# Patient Record
Sex: FEMALE | URBAN - METROPOLITAN AREA
[De-identification: names, ages, dates, MRNs, and addresses within clinical notes are randomized per-mention and may not be internally consistent; named-entity substitution may affect disease eponyms.]

---
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
## 2019-02-09 ENCOUNTER — APPOINTMENT (OUTPATIENT)
Dept: URBAN - METROPOLITAN AREA CLINIC 237 | Age: 19
Setting detail: DERMATOLOGY
End: 2019-02-09

## 2019-02-09 DIAGNOSIS — L81.0 POSTINFLAMMATORY HYPERPIGMENTATION: ICD-10-CM

## 2019-02-09 DIAGNOSIS — E78.00 PURE HYPERCHOLESTEROLEMIA, UNSPECIFIED: ICD-10-CM

## 2019-02-09 DIAGNOSIS — L70.0 ACNE VULGARIS: ICD-10-CM

## 2019-02-09 DIAGNOSIS — Z79.899 OTHER LONG TERM (CURRENT) DRUG THERAPY: ICD-10-CM

## 2019-02-09 PROCEDURE — OTHER PRESCRIPTION: OTHER

## 2019-02-09 PROCEDURE — OTHER ISOTRETINOIN MONITORING: OTHER

## 2019-02-09 PROCEDURE — OTHER MIPS QUALITY: OTHER

## 2019-02-09 PROCEDURE — OTHER OTHER: OTHER

## 2019-02-09 PROCEDURE — OTHER TREATMENT REGIMEN: OTHER

## 2019-02-09 PROCEDURE — OTHER PATIENT SPECIFIC COUNSELING: OTHER

## 2019-02-09 PROCEDURE — OTHER COUNSELING: OTHER

## 2019-02-09 PROCEDURE — OTHER ORDER TESTS: OTHER

## 2019-02-09 PROCEDURE — 99214 OFFICE O/P EST MOD 30 MIN: CPT

## 2019-02-09 RX ORDER — ISOTRETINOIN 10 MG/1
1 CAPSULE, LIQUID FILLED ORAL QD
Qty: 30 | Refills: 0 | Status: ERX

## 2019-02-09 ASSESSMENT — LOCATION DETAILED DESCRIPTION DERM
LOCATION DETAILED: LEFT LATERAL BUCCAL CHEEK
LOCATION DETAILED: RIGHT INFERIOR LATERAL BUCCAL CHEEK
LOCATION DETAILED: RIGHT CENTRAL BUCCAL CHEEK
LOCATION DETAILED: RIGHT INFERIOR CENTRAL MALAR CHEEK
LOCATION DETAILED: LEFT INFERIOR CENTRAL MALAR CHEEK
LOCATION DETAILED: LEFT CENTRAL MANDIBULAR CHEEK

## 2019-02-09 ASSESSMENT — LOCATION SIMPLE DESCRIPTION DERM
LOCATION SIMPLE: RIGHT CHEEK
LOCATION SIMPLE: LEFT CHEEK

## 2019-02-09 ASSESSMENT — LOCATION ZONE DERM: LOCATION ZONE: FACE

## 2019-02-09 NOTE — PROCEDURE: TREATMENT REGIMEN
Initiate Treatment: Isotretinoin 10 mg qd
Detail Level: Simple
Continue Regimen: OTC Qd\\nAldactone 100mg qd

## 2019-02-09 NOTE — PROCEDURE: PATIENT SPECIFIC COUNSELING
Detail Level: Zone
Pt DID NOT start her Acc. She did not fill her Rx from the pharmacy due to having issues with the [employer] system online. MD stressed that she needs to complete her online test in order to be eligible to receive her medication. Will proceed with dose of 10mg qd as originally planned.
Pt does still need to find a doctor who will monitor this condition. She needs to actively search (online) for someone that takes her insurance. Advised that the likelihood of her cholesterol rising while on Acc is significant even if she is proactive about controlling it. Due to this, we will not be able to continue tx for very long without proper monitoring.

## 2019-02-09 NOTE — PROCEDURE: ISOTRETINOIN MONITORING
Kilograms Preamble Statement (Weight Entered In Details Tab): Reported Weight in kilograms:
Patient Weight (Optional But Required For Cumulative Dose-Numbers And Decimals Only): 125
Female Completion Statement: After discussing her treatment course we decided to discontinue isotretinoin therapy at this time. I explained that she would need to continue her birth control methods for at least one month after the last dosage. She should also get a pregnancy test one month after the last dose. She shouldn't donate blood for one month after the last dose. She should call with any new symptoms of depression.
What Is The Patient's Gender: Female
Most Recent Beta Hcg (Optional): <1
Headache Monitoring: I recommended monitoring the headaches for now. There is no evidence of increased intracranial pressure. They were instructed to call if the headaches are worsening.
Dosing Month 1 (Required For Cumulative Dosing): 10mg Daily
Retinoid Dermatitis Normal Treatment: I recommended more frequent application of Cetaphil or CeraVe to the areas of dermatitis.
Xerosis Normal Treatment: I recommended application of Cetaphil or CeraVe numerous times a day and before going to bed to all dry areas.
Add Associated Diagnosis When Managing Medication Side Effects: Yes
Myalgia Monitoring: I explained this is common when taking isotretinoin. If this worsens they will contact us.
Most Recent Lfts (Optional): ALT: 13N; AST: 16N
Counseling Text: I reviewed the side effect in detail. Patient should get monthly blood tests, not donate blood, not drive at night if vision affected, and not share medication.
Hypertriglyceridemia Monitoring: I explained this is common when taking isotretinoin. We will monitor closely.
Myalgia Treatment: I explained this is common when taking isotretinoin. If this worsens they will contact us. They may try OTC ibuprofen.
Nosebleeds Normal Treatment: I explained this is common when taking isotretinoin. I recommended saline mist in each nostril multiple times a day. If this worsens they will contact us.
Female Pregnancy Counseling Text: Female patients should also be on two forms of birth control while taking this medication and for one month after their last dose.
Most Recent Cbc (Optional): All normal except: RBC: 5.30H, Hematocrit: 44.8H, MCH: 25L, MCHC: 30L, Platelet: 433H
Weight Units: pounds
Months Of Therapy Completed: 1
Calculate Months Of Therapy Based On Documented Dosages (Will Hide Months Of Therapy Question)?: No
Retinoid Dermatitis Aggressive Treatment: I recommended more frequent application of Cetaphil or CeraVe to the areas of dermatitis. I also prescribed a topical steroid for twice daily use until the dermatitis resolves.
Xerosis Aggressive Treatment: I recommended application of Cetaphil or CeraVe numerous times a day and before going to bed to all dry areas. I also prescribed a topical steroid for twice daily use.
Cheilitis Normal Treatment: I recommended application of Vaseline or Aquaphor numerous times a day (as often as every hour) and before going to bed.
Detail Level: Zone
Cheilitis Aggressive Treatment: I recommended application of Vaseline or Aquaphor numerous times a day (as often as every hour) and before going to bed. I also prescribed a topical steroid for twice daily use.
Male Completion Statement: After discussing his treatment course we decided to discontinue isotretinoin therapy at this time. He shouldn't donate blood for one month after the last dose. He should call with any new symptoms of depression.
Most Recent Triglycerides (Optional): 403H
Xerosis Aggressive Treatment: I recommended application of Cetaphil or CeraVe numerous times a day going to bed to all dry areas. I also prescribed a topical steroid for twice daily use.
Comments: Cumulative dose: 300mg (Range: 6,000-9,000mg/kg)
Xerosis Normal Treatment: I recommended application of Cetaphil or CeraVe numerous times a day going to bed to all dry areas.
Pounds Preamble Statement (Weight Entered In Details Tab): Reported Weight in pounds:
Most Recent Cholesterol (Optional): 340H; HDL: 64N; LDL: not calculated Non-HDL Cholesterol, calculated: 276H; Cholesterol/HDL Ratio: 5.3H

## 2019-02-09 NOTE — PROCEDURE: OTHER
Note Text (......Xxx Chief Complaint.): This diagnosis correlates with the SKs below bra line.
Detail Level: Simple
Other (Free Text): All mostly normal except cholesterol. See below PSC.

## 2019-03-13 ENCOUNTER — APPOINTMENT (OUTPATIENT)
Dept: URBAN - METROPOLITAN AREA CLINIC 237 | Age: 19
Setting detail: DERMATOLOGY
End: 2019-03-13

## 2019-03-13 DIAGNOSIS — L70.0 ACNE VULGARIS: ICD-10-CM

## 2019-03-13 DIAGNOSIS — Z79.899 OTHER LONG TERM (CURRENT) DRUG THERAPY: ICD-10-CM

## 2019-03-13 DIAGNOSIS — E78.00 PURE HYPERCHOLESTEROLEMIA, UNSPECIFIED: ICD-10-CM

## 2019-03-13 PROCEDURE — OTHER MIPS QUALITY: OTHER

## 2019-03-13 PROCEDURE — OTHER URINE PREGNANCY TEST: OTHER

## 2019-03-13 PROCEDURE — 81025 URINE PREGNANCY TEST: CPT

## 2019-03-13 PROCEDURE — OTHER PRESCRIPTION: OTHER

## 2019-03-13 PROCEDURE — OTHER PATIENT SPECIFIC COUNSELING: OTHER

## 2019-03-13 PROCEDURE — OTHER ORDER TESTS: OTHER

## 2019-03-13 PROCEDURE — OTHER OTHER: OTHER

## 2019-03-13 PROCEDURE — OTHER TREATMENT REGIMEN: OTHER

## 2019-03-13 PROCEDURE — 99214 OFFICE O/P EST MOD 30 MIN: CPT

## 2019-03-13 PROCEDURE — OTHER ISOTRETINOIN MONITORING: OTHER

## 2019-03-13 RX ORDER — ISOTRETINOIN 20 MG/1
1 CAPSULE, LIQUID FILLED ORAL QD
Qty: 30 | Refills: 0 | Status: ERX | COMMUNITY
Start: 2019-03-13

## 2019-03-13 ASSESSMENT — LOCATION SIMPLE DESCRIPTION DERM
LOCATION SIMPLE: RIGHT CHEEK
LOCATION SIMPLE: LEFT CHEEK

## 2019-03-13 ASSESSMENT — LOCATION ZONE DERM: LOCATION ZONE: FACE

## 2019-03-13 NOTE — PROCEDURE: ISOTRETINOIN MONITORING
Display Individual Monthly Dosage In The Note (If Yes Will Display All Dosages Which Are Not N/A): yes
Cheilitis Normal Treatment: I recommended application of Vaseline or Aquaphor numerous times a day (as often as every hour) and before going to bed.
Use Enhanced Counseling Feature (Automatic): No
Myalgia Monitoring: I explained this is common when taking isotretinoin. If this worsens they will contact us.
Months Of Therapy Completed: 1
Cheilitis Aggressive Treatment: I recommended application of Vaseline or Aquaphor numerous times a day (as often as every hour) and before going to bed. I also prescribed a topical steroid for twice daily use.
Hypertriglyceridemia Monitoring: I explained this is common when taking isotretinoin. We will monitor closely.
Myalgia Treatment: I explained this is common when taking isotretinoin. If this worsens they will contact us. They may try OTC ibuprofen.
Nosebleeds Normal Treatment: I explained this is common when taking isotretinoin. I recommended saline mist in each nostril multiple times a day. If this worsens they will contact us.
Pounds Preamble Statement (Weight Entered In Details Tab): Reported Weight in pounds:
Xerosis Normal Treatment: I recommended application of Cetaphil or CeraVe numerous times a day and before going to bed to all dry areas.
Most Recent Cbc (Optional): All normal except: MCH: 25L, MCHC: 30L, Platelet: 430H, Immature Granulocytes: 0.04H
Detail Level: Zone
Most Recent Triglycerides (Optional): 215H
What Is The Patient's Gender: Female
Retinoid Dermatitis Normal Treatment: I recommended more frequent application of Cetaphil or CeraVe to the areas of dermatitis.
Retinoid Dermatitis Aggressive Treatment: I recommended more frequent application of Cetaphil or CeraVe to the areas of dermatitis. I also prescribed a topical steroid for twice daily use until the dermatitis resolves.
Patient Weight (Optional But Required For Cumulative Dose-Numbers And Decimals Only): 125
Xerosis Aggressive Treatment: I recommended application of Cetaphil or CeraVe numerous times a day and before going to bed to all dry areas. I also prescribed a topical steroid for twice daily use.
Male Completion Statement: After discussing his treatment course we decided to discontinue isotretinoin therapy at this time. He shouldn't donate blood for one month after the last dose. He should call with any new symptoms of depression.
Kilograms Preamble Statement (Weight Entered In Details Tab): Reported Weight in kilograms:
Xerosis Normal Treatment: I recommended application of Cetaphil or CeraVe numerous times a day going to bed to all dry areas.
Female Completion Statement: After discussing her treatment course we decided to discontinue isotretinoin therapy at this time. I explained that she would need to continue her birth control methods for at least one month after the last dosage. She should also get a pregnancy test one month after the last dose. She shouldn't donate blood for one month after the last dose. She should call with any new symptoms of depression.
Dosing Month 2 (Required For Cumulative Dosing): 20mg Daily
Headache Monitoring: I recommended monitoring the headaches for now. There is no evidence of increased intracranial pressure. They were instructed to call if the headaches are worsening.
Most Recent Beta Hcg (Optional): <1
Counseling Text: I reviewed the side effect in detail. Patient should get monthly blood tests, not donate blood, not drive at night if vision affected, and not share medication.
Xerosis Aggressive Treatment: I recommended application of Cetaphil or CeraVe numerous times a day going to bed to all dry areas. I also prescribed a topical steroid for twice daily use.
Dosing Month 1 (Required For Cumulative Dosing): 10mg Daily
Comments: Cumulative dose: 300 + 600mg =900mg (Range: 6,000-9,000mg/kg)
Female Pregnancy Counseling Text: Female patients should also be on two forms of birth control while taking this medication and for one month after their last dose.
Most Recent Cholesterol (Optional): 289H; HDL: 60N; LDL: 186H; Non-HDL Cholesterol: 229H; Cholesterol/HDL Ratio: 4.8N
Most Recent Lfts (Optional): ALT: 12N; AST: 18N
Weight Units: pounds

## 2019-03-13 NOTE — PROCEDURE: OTHER
Detail Level: Simple
Other (Free Text): Pt’s CBC w/ Diff has some abnormal values, but nothing of concern at this time. Will monitor to see if they worsen. See above acne PSC for BW discussion RE: cholesterol.
Note Text (......Xxx Chief Complaint.): This diagnosis correlates with the SKs below bra line.

## 2019-03-13 NOTE — PROCEDURE: TREATMENT REGIMEN
Continue Regimen: OTC Qd\\nAldactone 100mg qd
Detail Level: Simple
Modify Regimen: Increase Isotretinoin to 20mg qd

## 2019-03-13 NOTE — PROCEDURE: PATIENT SPECIFIC COUNSELING
See above acne PSC.
MD recommends increasing Pt to 20mg Acc since she is taking measures to decrease her cholesterol. Advised Pt that her cholesterol level is still elevated, but is improved from baseline. Her triglycerides are higher than baseline, but MD is not as concerned about this level. Pt’s new PCP will need to be made aware that she is on Acc when she reviews her BW.\\n\\nRecommended ILS to a large IP on L temple, but pt states that it is going away fairly quickly so she would prefer not to tx.
Detail Level: Zone

## 2019-04-12 ENCOUNTER — APPOINTMENT (OUTPATIENT)
Dept: URBAN - METROPOLITAN AREA CLINIC 237 | Age: 19
Setting detail: DERMATOLOGY
End: 2019-04-12

## 2019-04-17 ENCOUNTER — APPOINTMENT (OUTPATIENT)
Dept: URBAN - METROPOLITAN AREA CLINIC 237 | Age: 19
Setting detail: DERMATOLOGY
End: 2019-04-17

## 2019-04-17 DIAGNOSIS — Z79.899 OTHER LONG TERM (CURRENT) DRUG THERAPY: ICD-10-CM

## 2019-04-17 DIAGNOSIS — E78.1 PURE HYPERGLYCERIDEMIA: ICD-10-CM

## 2019-04-17 DIAGNOSIS — L70.0 ACNE VULGARIS: ICD-10-CM

## 2019-04-17 DIAGNOSIS — E78.00 PURE HYPERCHOLESTEROLEMIA, UNSPECIFIED: ICD-10-CM

## 2019-04-17 PROCEDURE — OTHER ISOTRETINOIN MONITORING: OTHER

## 2019-04-17 PROCEDURE — 81025 URINE PREGNANCY TEST: CPT

## 2019-04-17 PROCEDURE — OTHER PRESCRIPTION: OTHER

## 2019-04-17 PROCEDURE — 99213 OFFICE O/P EST LOW 20 MIN: CPT

## 2019-04-17 PROCEDURE — OTHER MIPS QUALITY: OTHER

## 2019-04-17 PROCEDURE — OTHER ORDER TESTS: OTHER

## 2019-04-17 PROCEDURE — OTHER PATIENT SPECIFIC COUNSELING: OTHER

## 2019-04-17 PROCEDURE — OTHER TREATMENT REGIMEN: OTHER

## 2019-04-17 PROCEDURE — OTHER URINE PREGNANCY TEST: OTHER

## 2019-04-17 RX ORDER — NORGESTIMATE AND ETHINYL ESTRADIOL 7DAYSX3 28
KIT ORAL QD
Qty: 84 | Refills: 1 | Status: ERX

## 2019-04-17 RX ORDER — ISOTRETINOIN 20 MG/1
1 CAPSULE, LIQUID FILLED ORAL QD
Qty: 30 | Refills: 0 | Status: ERX

## 2019-04-17 RX ORDER — SIMVASTATIN 20 MG/1
1 TABLET, FILM COATED ORAL QD
Qty: 30 | Refills: 0 | Status: ERX | COMMUNITY
Start: 2019-04-17

## 2019-04-17 ASSESSMENT — LOCATION SIMPLE DESCRIPTION DERM
LOCATION SIMPLE: LEFT CHEEK
LOCATION SIMPLE: RIGHT CHEEK

## 2019-04-17 ASSESSMENT — LOCATION ZONE DERM: LOCATION ZONE: FACE

## 2019-04-17 NOTE — PROCEDURE: TREATMENT REGIMEN
Detail Level: Simple
Discontinue Regimen: Aldactone 100mg qd
Continue Regimen: OTC Qd\\nIsotretinoin to 20mg qd

## 2019-04-17 NOTE — PROCEDURE: PATIENT SPECIFIC COUNSELING
Detail Level: Zone
See above.
Advised pt she appears to be doing very well. While we could increase the dosage at this time to finish treatment faster, pt appears to be doing well on this dosage, but has high blood fats and these would worsen on a higher dose.
Pt currently under the care PCP (Dr. CARIDAD Berrios) for this problem. Since she is out of simvistatin, MD advised we can prescribe it to her (pt notes that her PCP increased her dosage to 20 mg). Pt is supposed to F/U w/ PCP in 2 mo.

## 2019-04-17 NOTE — PROCEDURE: ISOTRETINOIN MONITORING
Most Recent Cholesterol (Optional): 290H; HDL: 54N; LDL: 178H; Non-HDL Cholesterol: 236H; Cholesterol/HDL Ratio: 5.4
Is Xerosis Present?: Yes - Normal Treatment
Hypertriglyceridemia Treatment: I explained this is common when taking isotretinoin. If this worsens they will contact us. They may try OTC ibuprofen.
Retinoid Dermatitis Aggressive Treatment: I recommended more frequent application of Cetaphil or CeraVe to the areas of dermatitis. I also prescribed a topical steroid for twice daily use until the dermatitis resolves.
Add Associated Diagnosis When Managing Medication Side Effects: Yes
Hypertriglyceridemia Monitoring: I explained this is common when taking isotretinoin. If this worsens they will contact us.
Counseling Text: I reviewed the side effect in detail. Patient should get monthly blood tests, not donate blood, not drive at night if vision affected, and not share medication.
Completed Therapy?: No
Headache Monitoring: I recommended monitoring the headaches for now. There is no evidence of increased intracranial pressure. They were instructed to call if the headaches are worsening.
Dosing Month 2 (Required For Cumulative Dosing): 20mg Daily
Most Recent Triglycerides (Optional): 290H
Xerosis Normal Treatment: I recommended application of Cetaphil or CeraVe numerous times a day going to bed to all dry areas.
Cheilitis Normal Treatment: I recommended application of Vaseline or Aquaphor numerous times a day (as often as every hour) and before going to bed.
Patient Weight (Optional But Required For Cumulative Dose-Numbers And Decimals Only): 125
Hypertriglyceridemia Monitoring: I explained this is common when taking isotretinoin. We will monitor closely.
Kilograms Preamble Statement (Weight Entered In Details Tab): Reported Weight in kilograms:
Pounds Preamble Statement (Weight Entered In Details Tab): Reported Weight in pounds:
Dosing Month 1 (Required For Cumulative Dosing): 10mg Daily
Weight Units: pounds
Detail Level: Zone
Female Completion Statement: After discussing her treatment course we decided to discontinue isotretinoin therapy at this time. I explained that she would need to continue her birth control methods for at least one month after the last dosage. She should also get a pregnancy test one month after the last dose. She shouldn't donate blood for one month after the last dose. She should call with any new symptoms of depression.
Most Recent Cbc (Optional): All normal except MCH: 25L, MCHC: 30L, Platelets: 459H
Retinoid Dermatitis Normal Treatment: I recommended more frequent application of Cetaphil or CeraVe to the areas of dermatitis.
Most Recent Lfts (Optional): ALT: 15N; AST: 18N
Any Hypercholesterolemia?: Yes - Monitoring
Nosebleeds Normal Treatment: I explained this is common when taking isotretinoin. I recommended saline mist in each nostril multiple times a day. If this worsens they will contact us.
Xerosis Aggressive Treatment: I recommended application of Cetaphil or CeraVe numerous times a day and before going to bed to all dry areas. I also prescribed a topical steroid for twice daily use.
Most Recent Beta Hcg (Optional): <1 (Labs are from 4/6/19)
Months Of Therapy Completed: 3
Xerosis Aggressive Treatment: I recommended application of Cetaphil or CeraVe numerous times a day going to bed to all dry areas. I also prescribed a topical steroid for twice daily use.
Cheilitis Aggressive Treatment: I recommended application of Vaseline or Aquaphor numerous times a day (as often as every hour) and before going to bed. I also prescribed a topical steroid for twice daily use.
Male Completion Statement: After discussing his treatment course we decided to discontinue isotretinoin therapy at this time. He shouldn't donate blood for one month after the last dose. He should call with any new symptoms of depression.
What Is The Patient's Gender: Female
Comments: Cumulative dose: 900mg + 600 mg= 1500 mg Range: 6,000-9,000mg/kg)
Xerosis Normal Treatment: I recommended application of Cetaphil or CeraVe numerous times a day and before going to bed to all dry areas.
Female Pregnancy Counseling Text: Female patients should also be on two forms of birth control while taking this medication and for one month after their last dose.

## 2019-05-15 ENCOUNTER — APPOINTMENT (OUTPATIENT)
Dept: URBAN - METROPOLITAN AREA CLINIC 237 | Age: 19
Setting detail: DERMATOLOGY
End: 2019-05-15

## 2019-05-15 DIAGNOSIS — E78.00 PURE HYPERCHOLESTEROLEMIA, UNSPECIFIED: ICD-10-CM

## 2019-05-15 DIAGNOSIS — E78.1 PURE HYPERGLYCERIDEMIA: ICD-10-CM

## 2019-05-15 DIAGNOSIS — L70.0 ACNE VULGARIS: ICD-10-CM

## 2019-05-15 DIAGNOSIS — Z79.899 OTHER LONG TERM (CURRENT) DRUG THERAPY: ICD-10-CM

## 2019-05-15 PROCEDURE — OTHER ORDER TESTS: OTHER

## 2019-05-15 PROCEDURE — OTHER MIPS QUALITY: OTHER

## 2019-05-15 PROCEDURE — OTHER TREATMENT REGIMEN: OTHER

## 2019-05-15 PROCEDURE — OTHER PRESCRIPTION: OTHER

## 2019-05-15 PROCEDURE — OTHER PATIENT SPECIFIC COUNSELING: OTHER

## 2019-05-15 PROCEDURE — 99213 OFFICE O/P EST LOW 20 MIN: CPT

## 2019-05-15 PROCEDURE — OTHER ISOTRETINOIN MONITORING: OTHER

## 2019-05-15 PROCEDURE — 81025 URINE PREGNANCY TEST: CPT

## 2019-05-15 PROCEDURE — OTHER URINE PREGNANCY TEST: OTHER

## 2019-05-15 RX ORDER — ISOTRETINOIN 10 MG/1
1 CAPSULE, LIQUID FILLED ORAL QD
Qty: 30 | Refills: 0 | Status: ERX | COMMUNITY
Start: 2019-05-15

## 2019-05-15 ASSESSMENT — SEVERITY ASSESSMENT OVERALL AMONG ALL PATIENTS
IN YOUR EXPERIENCE, AMONG ALL PATIENTS YOU HAVE SEEN WITH THIS CONDITION, HOW SEVERE IS THIS PATIENT'S CONDITION?: ALMOST CLEAR

## 2019-05-15 ASSESSMENT — LOCATION SIMPLE DESCRIPTION DERM
LOCATION SIMPLE: LEFT CHEEK
LOCATION SIMPLE: RIGHT CHEEK

## 2019-05-15 ASSESSMENT — LOCATION ZONE DERM: LOCATION ZONE: FACE

## 2019-05-15 NOTE — PROCEDURE: ISOTRETINOIN MONITORING
Most Recent Cholesterol (Optional): 342H; HDL: 49N; LDL: cannot he calculated due to high TGs, Non-HDL Cholesterol: 293H; Cholesterol/HDL Ratio: 7.0
Are Labs Available For Review?: Yes
What Is The Patient's Gender: Female
Myalgia Monitoring: I explained this is common when taking isotretinoin. If this worsens they will contact us.
Headache Monitoring: I recommended monitoring the headaches for now. There is no evidence of increased intracranial pressure. They were instructed to call if the headaches are worsening.
Male Completion Statement: After discussing his treatment course we decided to discontinue isotretinoin therapy at this time. He shouldn't donate blood for one month after the last dose. He should call with any new symptoms of depression.
Cheilitis Aggressive Treatment: I recommended application of Vaseline or Aquaphor numerous times a day (as often as every hour) and before going to bed. I also prescribed a topical steroid for twice daily use.
Xerosis Normal Treatment: I recommended application of Cetaphil or CeraVe numerous times a day and before going to bed to all dry areas.
Pounds Preamble Statement (Weight Entered In Details Tab): Reported Weight in pounds:
Most Recent Beta Hcg (Optional): <1
Retinoid Dermatitis Normal Treatment: I recommended more frequent application of Cetaphil or CeraVe to the areas of dermatitis.
Most Recent Cbc (Optional): All normal except MCH: 25L, MCHC: 30L, Platelets: 420H, RBC: 5.23H
Detail Level: Zone
Calculate Months Of Therapy Based On Documented Dosages (Will Hide Months Of Therapy Question)?: No
Myalgia Treatment: I explained this is common when taking isotretinoin. If this worsens they will contact us. They may try OTC ibuprofen.
Dosing Month 4 (Required For Cumulative Dosing): 10mg Daily
Cheilitis Normal Treatment: I recommended application of Vaseline or Aquaphor numerous times a day (as often as every hour) and before going to bed.
Hypercholesterolemia Monitoring: I explained this is common when taking isotretinoin. We will monitor closely.
Comments: Cumulative dose: 1500 mg + 300 mg= 1800 mg  (Range: 6,000-9,000mg/kg)
Dosing Month 2 (Required For Cumulative Dosing): 20mg Daily
Kilograms Preamble Statement (Weight Entered In Details Tab): Reported Weight in kilograms:
Hypertriglyceridemia Monitoring: I explained this is common when taking isotretinoin.  See PSC under acne
Months Of Therapy Completed: 4
Nosebleeds Normal Treatment: I explained this is common when taking isotretinoin. I recommended saline mist in each nostril multiple times a day. If this worsens they will contact us.
Xerosis Aggressive Treatment: I recommended application of Cetaphil or CeraVe numerous times a day and before going to bed to all dry areas. I also prescribed a topical steroid for twice daily use.
Any Hypertriglyceridemia?: Yes - Monitoring
Retinoid Dermatitis Aggressive Treatment: I recommended more frequent application of Cetaphil or CeraVe to the areas of dermatitis. I also prescribed a topical steroid for twice daily use until the dermatitis resolves.
Weight Units: pounds
Xerosis Normal Treatment: I recommended application of Cetaphil or CeraVe numerous times a day going to bed to all dry areas.
Is Cheilitis Present?: Yes - Normal Treatment
Most Recent Triglycerides (Optional): 529H
Female Pregnancy Counseling Text: Female patients should also be on two forms of birth control while taking this medication and for one month after their last dose.
Counseling Text: I reviewed the side effect in detail. Patient should get monthly blood tests, not donate blood, not drive at night if vision affected, and not share medication.
Female Completion Statement: After discussing her treatment course we decided to discontinue isotretinoin therapy at this time. I explained that she would need to continue her birth control methods for at least one month after the last dosage. She should also get a pregnancy test one month after the last dose. She shouldn't donate blood for one month after the last dose. She should call with any new symptoms of depression.
Most Recent Lfts (Optional): ALT: 19N; AST: 18N
Xerosis Aggressive Treatment: I recommended application of Cetaphil or CeraVe numerous times a day going to bed to all dry areas. I also prescribed a topical steroid for twice daily use.
Patient Weight (Optional But Required For Cumulative Dose-Numbers And Decimals Only): 125

## 2019-05-15 NOTE — PROCEDURE: PATIENT SPECIFIC COUNSELING
Advised pt of significant cholesterol and triglyceride elevations, even while she is on Simvastatin 20 mg. Discussed that we will need to decrease dose down to 10 mg due to this. Also advised pt she needs to get in to see her PCP ASAP to discuss these results. Will give pt copy of results to share with her doctor. Recheck lipid panel in 2 weeks
Detail Level: Zone
See above.

## 2019-06-14 ENCOUNTER — APPOINTMENT (OUTPATIENT)
Dept: URBAN - METROPOLITAN AREA CLINIC 237 | Age: 19
Setting detail: DERMATOLOGY
End: 2019-07-09

## 2019-06-14 DIAGNOSIS — K13.0 DISEASES OF LIPS: ICD-10-CM

## 2019-06-14 DIAGNOSIS — E78.1 PURE HYPERGLYCERIDEMIA: ICD-10-CM

## 2019-06-14 DIAGNOSIS — Z79.899 OTHER LONG TERM (CURRENT) DRUG THERAPY: ICD-10-CM

## 2019-06-14 DIAGNOSIS — L70.0 ACNE VULGARIS: ICD-10-CM

## 2019-06-14 DIAGNOSIS — L91.0 HYPERTROPHIC SCAR: ICD-10-CM

## 2019-06-14 PROCEDURE — OTHER MIPS QUALITY: OTHER

## 2019-06-14 PROCEDURE — 99213 OFFICE O/P EST LOW 20 MIN: CPT | Mod: 25

## 2019-06-14 PROCEDURE — OTHER URINE PREGNANCY TEST: OTHER

## 2019-06-14 PROCEDURE — OTHER COUNSELING: OTHER

## 2019-06-14 PROCEDURE — OTHER ISOTRETINOIN MONITORING: OTHER

## 2019-06-14 PROCEDURE — OTHER INTRALESIONAL KENALOG: OTHER

## 2019-06-14 PROCEDURE — OTHER PATIENT SPECIFIC COUNSELING: OTHER

## 2019-06-14 PROCEDURE — 11900 INJECT SKIN LESIONS </W 7: CPT

## 2019-06-14 PROCEDURE — 81025 URINE PREGNANCY TEST: CPT

## 2019-06-14 PROCEDURE — OTHER PRESCRIPTION: OTHER

## 2019-06-14 PROCEDURE — OTHER TREATMENT REGIMEN: OTHER

## 2019-06-14 PROCEDURE — OTHER ORDER TESTS: OTHER

## 2019-06-14 RX ORDER — NYSTATIN AND TRIAMCINOLONE ACETONIDE 100000; 1 [USP'U]/G; MG/G
OINTMENT TOPICAL
Qty: 1 | Refills: 0 | Status: ERX | COMMUNITY
Start: 2019-06-14

## 2019-06-14 RX ORDER — ISOTRETINOIN 20 MG/1
CAPSULE, LIQUID FILLED ORAL
Qty: 30 | Refills: 0 | Status: ERX

## 2019-06-14 ASSESSMENT — LOCATION DETAILED DESCRIPTION DERM
LOCATION DETAILED: LEFT CENTRAL MANDIBULAR CHEEK
LOCATION DETAILED: RIGHT INFERIOR LATERAL BUCCAL CHEEK
LOCATION DETAILED: UPPER STERNUM
LOCATION DETAILED: RIGHT INFERIOR VERMILION LIP

## 2019-06-14 ASSESSMENT — LOCATION ZONE DERM
LOCATION ZONE: TRUNK
LOCATION ZONE: LIP
LOCATION ZONE: FACE

## 2019-06-14 ASSESSMENT — LOCATION SIMPLE DESCRIPTION DERM
LOCATION SIMPLE: LEFT CHEEK
LOCATION SIMPLE: CHEST
LOCATION SIMPLE: RIGHT LIP
LOCATION SIMPLE: RIGHT CHEEK

## 2019-06-14 NOTE — PROCEDURE: TREATMENT REGIMEN
Detail Level: Simple
Continue Regimen: OTC Qd
Continue Regimen: AHU frequently
Modify Regimen: Isotretinoin 20 mg qod until PCP OV
Detail Level: Zone
Initiate Treatment: Mycolog-II bid until clear

## 2019-06-14 NOTE — PROCEDURE: INTRALESIONAL KENALOG
Medical Necessity Clause: This procedure was medically necessary because the lesions that were treated were:
X Size Of Lesion In Cm (Optional): 0
Administered By (Optional): PA
Consent: The risks of atrophy were reviewed with the patient.
Include Z78.9 (Other Specified Conditions Influencing Health Status) As An Associated Diagnosis?: No
Kenalog Preparation: Kenalog
Total Volume Injected (Ccs- Only Use Numbers And Decimals): 0.1
Detail Level: Detailed
Concentration Of Solution Injected (Mg/Ml): 10.0

## 2019-06-14 NOTE — PROCEDURE: ISOTRETINOIN MONITORING
Most Recent Cbc (Optional): All normal except MCH: 25L, MCHC: 30L, RBC: 5.10H
Dosing Month 4 (Required For Cumulative Dosing): 10mg Daily
Use Enhanced Counseling Feature (Automatic): No
Hypercholesterolemia Monitoring: I explained this is common when taking isotretinoin. We will monitor closely.
Patient Weight (Optional But Required For Cumulative Dose-Numbers And Decimals Only): 125
Weight Units: pounds
Cheilitis Aggressive Treatment: I recommended application of Vaseline or Aquaphor numerous times a day (as often as every hour) and before going to bed. I also prescribed a topical steroid for twice daily use.
Xerosis Aggressive Treatment: I recommended application of Cetaphil or CeraVe numerous times a day going to bed to all dry areas. I also prescribed a topical steroid for twice daily use.
Cheilitis Normal Treatment: I recommended application of Vaseline or Aquaphor numerous times a day (as often as every hour) and before going to bed.
Headache Monitoring: I recommended monitoring the headaches for now. There is no evidence of increased intracranial pressure. They were instructed to call if the headaches are worsening.
Hypertriglyceridemia Monitoring: I explained this is common when taking isotretinoin.  See PSC under acne.
Most Recent Triglycerides (Optional): 330H
Female Completion Statement: After discussing her treatment course we decided to discontinue isotretinoin therapy at this time. I explained that she would need to continue her birth control methods for at least one month after the last dosage. She should also get a pregnancy test one month after the last dose. She shouldn't donate blood for one month after the last dose. She should call with any new symptoms of depression.
Most Recent Beta Hcg (Optional): <1
Counseling Text: I reviewed the side effect in detail. Patient should get monthly blood tests, not donate blood, not drive at night if vision affected, and not share medication.
Lower Range (In Mg/Kg): 120
Xerosis Aggressive Treatment: I recommended application of Cetaphil or CeraVe numerous times a day and before going to bed to all dry areas. I also prescribed a topical steroid for twice daily use.
Is Cheilitis Present?: Yes - Normal Treatment
Female Pregnancy Counseling Text: Female patients should also be on two forms of birth control while taking this medication and for one month after their last dose.
Comments: Cumulative dose: 1800 mg + 600 mg= 2400 mg  (Range: 6,000-9,000mg/kg)
Dosing Month 2 (Required For Cumulative Dosing): 20mg Daily
Add Associated Diagnosis When Managing Medication Side Effects: Yes
Hypertriglyceridemia Treatment: I explained this is common when taking isotretinoin. If this worsens they will contact us. They may try OTC ibuprofen.
Retinoid Dermatitis Aggressive Treatment: I recommended more frequent application of Cetaphil or CeraVe to the areas of dermatitis. I also prescribed a topical steroid for twice daily use until the dermatitis resolves.
Male Completion Statement: After discussing his treatment course we decided to discontinue isotretinoin therapy at this time. He shouldn't donate blood for one month after the last dose. He should call with any new symptoms of depression.
Upper Range (In Mg/Kg): 150
Xerosis Normal Treatment: I recommended application of Cetaphil or CeraVe numerous times a day going to bed to all dry areas.
Myalgia Monitoring: I explained this is common when taking isotretinoin. If this worsens they will contact us.
Most Recent Cholesterol (Optional): Cholesterol: 262H; HDL: 51N; LDL: 145H, Non-HDL Cholesterol: 211H; Cholesterol/HDL Ratio: 5.1H
Any Hypercholesterolemia?: Yes - Monitoring
Nosebleeds Normal Treatment: I explained this is common when taking isotretinoin. I recommended saline mist in each nostril multiple times a day. If this worsens they will contact us.
Most Recent Lfts (Optional): ALT: 14N; AST: 20N
Detail Level: Zone
Target Cumulative Dosage (In Mg/Kg): 135
Months Of Therapy Completed: 5
Pounds Preamble Statement (Weight Entered In Details Tab): Reported Weight in pounds:
Kilograms Preamble Statement (Weight Entered In Details Tab): Reported Weight in kilograms:
Retinoid Dermatitis Normal Treatment: I recommended more frequent application of Cetaphil or CeraVe to the areas of dermatitis.
What Is The Patient's Gender: Female
Xerosis Normal Treatment: I recommended application of Cetaphil or CeraVe numerous times a day and before going to bed to all dry areas.

## 2019-06-14 NOTE — PROCEDURE: PATIENT SPECIFIC COUNSELING
Triglycerides have improved since LOV, but are still elevated. Pt has appt w/ PCP next wk Wed- given copy of BW to share with her doctor at 10 mg Isotretinoin and 20 mg Simvastatin. Recommends pt begin Acc 20 mg qod until she sees PCP to make any necessary alterations in her cholesterol medications
Detail Level: Zone

## 2019-07-15 ENCOUNTER — APPOINTMENT (OUTPATIENT)
Dept: URBAN - METROPOLITAN AREA CLINIC 237 | Age: 19
Setting detail: DERMATOLOGY
End: 2019-07-15

## 2019-07-15 DIAGNOSIS — E78.1 PURE HYPERGLYCERIDEMIA: ICD-10-CM

## 2019-07-15 DIAGNOSIS — K13.0 DISEASES OF LIPS: ICD-10-CM

## 2019-07-15 DIAGNOSIS — E78.00 PURE HYPERCHOLESTEROLEMIA, UNSPECIFIED: ICD-10-CM

## 2019-07-15 DIAGNOSIS — L70.0 ACNE VULGARIS: ICD-10-CM

## 2019-07-15 DIAGNOSIS — Z79.899 OTHER LONG TERM (CURRENT) DRUG THERAPY: ICD-10-CM

## 2019-07-15 PROCEDURE — OTHER ISOTRETINOIN MONITORING: OTHER

## 2019-07-15 PROCEDURE — 81025 URINE PREGNANCY TEST: CPT

## 2019-07-15 PROCEDURE — OTHER TREATMENT REGIMEN: OTHER

## 2019-07-15 PROCEDURE — OTHER ORDER TESTS: OTHER

## 2019-07-15 PROCEDURE — OTHER COUNSELING: OTHER

## 2019-07-15 PROCEDURE — OTHER MIPS QUALITY: OTHER

## 2019-07-15 PROCEDURE — OTHER URINE PREGNANCY TEST: OTHER

## 2019-07-15 PROCEDURE — 99213 OFFICE O/P EST LOW 20 MIN: CPT

## 2019-07-15 PROCEDURE — OTHER PATIENT SPECIFIC COUNSELING: OTHER

## 2019-07-15 ASSESSMENT — LOCATION DETAILED DESCRIPTION DERM
LOCATION DETAILED: LEFT CENTRAL MANDIBULAR CHEEK
LOCATION DETAILED: RIGHT INFERIOR LATERAL BUCCAL CHEEK
LOCATION DETAILED: RIGHT INFERIOR VERMILION LIP

## 2019-07-15 ASSESSMENT — LOCATION SIMPLE DESCRIPTION DERM
LOCATION SIMPLE: RIGHT LIP
LOCATION SIMPLE: LEFT CHEEK
LOCATION SIMPLE: RIGHT CHEEK

## 2019-07-15 ASSESSMENT — LOCATION ZONE DERM
LOCATION ZONE: LIP
LOCATION ZONE: FACE

## 2019-07-15 NOTE — PROCEDURE: PATIENT SPECIFIC COUNSELING
Detail Level: Zone
See PSC above.
MD recommends just completing her remaining pills from last month. Pt’s BW is still elevated despite the fact she has increased her Simvastatin to 40 mg. Will re-assess dosage next month. Luckily acne is well controlled.
See above.

## 2019-07-15 NOTE — PROCEDURE: TREATMENT REGIMEN
Continue Regimen: AHU frequently\\nMycolog-II bid prn
Detail Level: Simple
Modify Regimen: Isotretinoin 20 mg qod until gone (last month’s Rx)
Detail Level: Zone
Continue Regimen: OTC Qd

## 2019-07-15 NOTE — PROCEDURE: ISOTRETINOIN MONITORING
Any Hypertriglyceridemia?: Yes - Monitoring
Target Cumulative Dosage (In Mg/Kg): 135
Dosing Month 3 (Required For Cumulative Dosing): 20mg Daily
Nosebleeds Normal Treatment: I explained this is common when taking isotretinoin. I recommended saline mist in each nostril multiple times a day. If this worsens they will contact us.
Myalgia Treatment: I explained this is common when taking isotretinoin. If this worsens they will contact us. They may try OTC ibuprofen.
Hypercholesterolemia Treatment: PCP prescribing Simvastatin.
Male Completion Statement: After discussing his treatment course we decided to discontinue isotretinoin therapy at this time. He shouldn't donate blood for one month after the last dose. He should call with any new symptoms of depression.
Retinoid Dermatitis Normal Treatment: I recommended more frequent application of Cetaphil or CeraVe to the areas of dermatitis.
Most Recent Cbc (Optional): All normal except MCH: 25L, MCHC: 31L, RDW CV: 408H
Any Myalgia?: No
Cheilitis Normal Treatment: I recommended application of Vaseline or Aquaphor numerous times a day (as often as every hour) and before going to bed.
Most Recent Cholesterol (Optional): Cholesterol: 251H; HDL: 54N; LDL: 142H, Non-HDL Cholesterol: 197H; Cholesterol/HDL Ratio: 4.6N
Cheilitis Aggressive Treatment: I recommended application of Vaseline or Aquaphor numerous times a day (as often as every hour) and before going to bed. I also prescribed a topical steroid for twice daily use.
Most Recent Beta Hcg (Optional): <1
Hypercholesterolemia Monitoring: I explained this is common when taking isotretinoin. We will monitor closely.
Is Cheilitis Present?: Yes - Normal Treatment
Female Pregnancy Counseling Text: Female patients should also be on two forms of birth control while taking this medication and for one month after their last dose.
Retinoid Dermatitis Aggressive Treatment: I recommended more frequent application of Cetaphil or CeraVe to the areas of dermatitis. I also prescribed a topical steroid for twice daily use until the dermatitis resolves.
Any Hypercholesterolemia?: Yes - Treatment
Display Individual Monthly Dosage In The Note (If Yes Will Display All Dosages Which Are Not N/A): yes
Headache Monitoring: I recommended monitoring the headaches for now. There is no evidence of increased intracranial pressure. They were instructed to call if the headaches are worsening.
Female Completion Statement: After discussing her treatment course we decided to discontinue isotretinoin therapy at this time. I explained that she would need to continue her birth control methods for at least one month after the last dosage. She should also get a pregnancy test one month after the last dose. She shouldn't donate blood for one month after the last dose. She should call with any new symptoms of depression.
Detail Level: Zone
What Is The Patient's Gender: Female
Xerosis Normal Treatment: I recommended application of Cetaphil or CeraVe numerous times a day going to bed to all dry areas.
Myalgia Monitoring: I explained this is common when taking isotretinoin. If this worsens they will contact us.
Counseling Text: I reviewed the side effect in detail. Patient should get monthly blood tests, not donate blood, not drive at night if vision affected, and not share medication.
Weight Units: pounds
Patient Weight (Optional But Required For Cumulative Dose-Numbers And Decimals Only): 125
Hypertriglyceridemia Monitoring: I explained this is common when taking isotretinoin.  See PSC under acne.
Months Of Therapy Completed: 6
Kilograms Preamble Statement (Weight Entered In Details Tab): Reported Weight in kilograms:
Xerosis Normal Treatment: I recommended application of Cetaphil or CeraVe numerous times a day and before going to bed to all dry areas.
Pounds Preamble Statement (Weight Entered In Details Tab): Reported Weight in pounds:
Dosing Month 4 (Required For Cumulative Dosing): 10mg Daily
Most Recent Triglycerides (Optional): 274H
Most Recent Lfts (Optional): ALT: 10N; AST: 16N
Lower Range (In Mg/Kg): 120
Upper Range (In Mg/Kg): 150
Xerosis Aggressive Treatment: I recommended application of Cetaphil or CeraVe numerous times a day going to bed to all dry areas. I also prescribed a topical steroid for twice daily use.
Comments: Cumulative dose: 1800 mg + 600 mg= 2400 mg  (Range: 6,000-9,000mg/kg)
Xerosis Aggressive Treatment: I recommended application of Cetaphil or CeraVe numerous times a day and before going to bed to all dry areas. I also prescribed a topical steroid for twice daily use.

## 2019-08-16 ENCOUNTER — APPOINTMENT (OUTPATIENT)
Dept: URBAN - METROPOLITAN AREA CLINIC 237 | Age: 19
Setting detail: DERMATOLOGY
End: 2019-08-16

## 2019-08-16 DIAGNOSIS — Z79.899 OTHER LONG TERM (CURRENT) DRUG THERAPY: ICD-10-CM

## 2019-08-16 DIAGNOSIS — E78.1 PURE HYPERGLYCERIDEMIA: ICD-10-CM

## 2019-08-16 DIAGNOSIS — L70.0 ACNE VULGARIS: ICD-10-CM

## 2019-08-16 DIAGNOSIS — K13.0 DISEASES OF LIPS: ICD-10-CM

## 2019-08-16 DIAGNOSIS — E78.00 PURE HYPERCHOLESTEROLEMIA, UNSPECIFIED: ICD-10-CM

## 2019-08-16 PROCEDURE — 81025 URINE PREGNANCY TEST: CPT

## 2019-08-16 PROCEDURE — OTHER TREATMENT REGIMEN: OTHER

## 2019-08-16 PROCEDURE — OTHER COUNSELING: OTHER

## 2019-08-16 PROCEDURE — OTHER PATIENT SPECIFIC COUNSELING: OTHER

## 2019-08-16 PROCEDURE — OTHER MIPS QUALITY: OTHER

## 2019-08-16 PROCEDURE — 99213 OFFICE O/P EST LOW 20 MIN: CPT

## 2019-08-16 PROCEDURE — OTHER URINE PREGNANCY TEST: OTHER

## 2019-08-16 PROCEDURE — OTHER ORDER TESTS: OTHER

## 2019-08-16 PROCEDURE — OTHER ISOTRETINOIN MONITORING: OTHER

## 2019-08-16 ASSESSMENT — LOCATION SIMPLE DESCRIPTION DERM
LOCATION SIMPLE: LEFT CHEEK
LOCATION SIMPLE: RIGHT CHEEK
LOCATION SIMPLE: RIGHT LIP

## 2019-08-16 ASSESSMENT — LOCATION DETAILED DESCRIPTION DERM
LOCATION DETAILED: RIGHT INFERIOR VERMILION LIP
LOCATION DETAILED: RIGHT INFERIOR LATERAL BUCCAL CHEEK
LOCATION DETAILED: LEFT CENTRAL MANDIBULAR CHEEK

## 2019-08-16 ASSESSMENT — LOCATION ZONE DERM
LOCATION ZONE: LIP
LOCATION ZONE: FACE

## 2019-08-16 NOTE — PROCEDURE: PATIENT SPECIFIC COUNSELING
Detail Level: Simple
Detail Level: Zone
See above.
Recommends resuming Mycolog-II bid as lips are very inflamed.
See PSC above.
Reviewed BW with Pt. Advised values are stable.
Discussed increasing to Isotretinoin 20mg qd, but recommends rechecking cholesterol in 2 weeks to make sure lipids remains stable.

## 2019-08-16 NOTE — PROCEDURE: ISOTRETINOIN MONITORING
Cheilitis Normal Treatment: I recommended application of Vaseline or Aquaphor numerous times a day (as often as every hour) and before going to bed. See Cheilitis PSC below.
Hypertriglyceridemia Treatment: I explained this is common when taking isotretinoin. If this worsens they will contact us. They may try OTC ibuprofen.
Cheilitis Aggressive Treatment: I recommended application of Vaseline or Aquaphor numerous times a day (as often as every hour) and before going to bed. I also prescribed a topical steroid for twice daily use.
Dosing Month 7 (Required For Cumulative Dosing): 20mg Daily
Cheilitis Normal Treatment: I recommended application of Vaseline or Aquaphor numerous times a day (as often as every hour) and before going to bed.
Xerosis Aggressive Treatment: I recommended application of Cetaphil or CeraVe numerous times a day going to bed to all dry areas. I also prescribed a topical steroid for twice daily use.
Myalgia Monitoring: I explained this is common when taking isotretinoin. If this worsens they will contact us.
Retinoid Dermatitis Aggressive Treatment: I recommended more frequent application of Cetaphil or CeraVe to the areas of dermatitis. I also prescribed a topical steroid for twice daily use until the dermatitis resolves.
Any Myalgia?: No
Comments: Cumulative dose: 2400 mg + 600 mg= 3000 mg  (Range: 6,000-9,000mg/kg)
Kilograms Preamble Statement (Weight Entered In Details Tab): Reported Weight in kilograms:
Retinoid Dermatitis Normal Treatment: I recommended more frequent application of Cetaphil or CeraVe to the areas of dermatitis.
Most Recent Triglycerides (Optional): 232H
Hypercholesterolemia Treatment: PCP prescribing Simvastatin 40mg
Hypertriglyceridemia Monitoring: I explained this is common when taking isotretinoin.
Any Hypertriglyceridemia?: Yes - Monitoring
Hypertriglyceridemia Monitoring: I explained this is common when taking isotretinoin. We will monitor closely.
Headache Monitoring: I recommended monitoring the headaches for now. There is no evidence of increased intracranial pressure. They were instructed to call if the headaches are worsening.
Most Recent Cbc (Optional): All normal except MCH: 25L, MCHC: 31L, Platelet: 447J
Pounds Preamble Statement (Weight Entered In Details Tab): Reported Weight in pounds:
Display Individual Monthly Dosage In The Note (If Yes Will Display All Dosages Which Are Not N/A): yes
Most Recent Lfts (Optional): ALT: 10N; AST: 18N
Counseling Text: I reviewed the side effect in detail. Patient should get monthly blood tests, not donate blood, not drive at night if vision affected, and not share medication.
Weight Units: pounds
Upper Range (In Mg/Kg): 150
Target Cumulative Dosage (In Mg/Kg): 135
Dosing Month 1 (Required For Cumulative Dosing): 10mg Daily
Patient Weight (Optional But Required For Cumulative Dose-Numbers And Decimals Only): 125
Xerosis Aggressive Treatment: I recommended application of Cetaphil or CeraVe numerous times a day and before going to bed to all dry areas. I also prescribed a topical steroid for twice daily use.
Most Recent Cholesterol (Optional): Cholesterol: 230H; LDL: 135H, Non-HDL Cholesterol: 181H
Months Of Therapy Completed: 8
Nosebleeds Normal Treatment: I explained this is common when taking isotretinoin. I recommended saline mist in each nostril multiple times a day. If this worsens they will contact us.
Xerosis Normal Treatment: I recommended application of Cetaphil or CeraVe numerous times a day going to bed to all dry areas.
Any Hypercholesterolemia?: Yes - Treatment
What Is The Patient's Gender: Female
Xerosis Normal Treatment: I recommended application of Cetaphil or CeraVe numerous times a day and before going to bed to all dry areas.
Female Completion Statement: After discussing her treatment course we decided to discontinue isotretinoin therapy at this time. I explained that she would need to continue her birth control methods for at least one month after the last dosage. She should also get a pregnancy test one month after the last dose. She shouldn't donate blood for one month after the last dose. She should call with any new symptoms of depression.
Lower Range (In Mg/Kg): 120
Is Cheilitis Present?: Yes - Normal Treatment
Detail Level: Zone
Male Completion Statement: After discussing his treatment course we decided to discontinue isotretinoin therapy at this time. He shouldn't donate blood for one month after the last dose. He should call with any new symptoms of depression.
Female Pregnancy Counseling Text: Female patients should also be on two forms of birth control while taking this medication and for one month after their last dose.

## 2019-08-16 NOTE — PROCEDURE: TREATMENT REGIMEN
Continue Regimen: AHU frequently
Detail Level: Zone
Detail Level: Simple
Modify Regimen: Increase to Isotretinoin 20 mg qd
Continue Regimen: OTC Qd
Modify Regimen: Resume Mycolog-II bid prn

## 2019-09-13 ENCOUNTER — APPOINTMENT (OUTPATIENT)
Dept: URBAN - METROPOLITAN AREA CLINIC 237 | Age: 19
Setting detail: DERMATOLOGY
End: 2019-09-13

## 2019-09-13 DIAGNOSIS — Z79.899 OTHER LONG TERM (CURRENT) DRUG THERAPY: ICD-10-CM

## 2019-09-13 DIAGNOSIS — L70.0 ACNE VULGARIS: ICD-10-CM

## 2019-09-13 DIAGNOSIS — E78.00 PURE HYPERCHOLESTEROLEMIA, UNSPECIFIED: ICD-10-CM

## 2019-09-13 DIAGNOSIS — K13.0 DISEASES OF LIPS: ICD-10-CM

## 2019-09-13 DIAGNOSIS — E78.1 PURE HYPERGLYCERIDEMIA: ICD-10-CM

## 2019-09-13 PROCEDURE — OTHER MIPS QUALITY: OTHER

## 2019-09-13 PROCEDURE — 99213 OFFICE O/P EST LOW 20 MIN: CPT

## 2019-09-13 PROCEDURE — OTHER TREATMENT REGIMEN: OTHER

## 2019-09-13 PROCEDURE — OTHER COUNSELING: OTHER

## 2019-09-13 PROCEDURE — OTHER PRESCRIPTION: OTHER

## 2019-09-13 PROCEDURE — OTHER PATIENT SPECIFIC COUNSELING: OTHER

## 2019-09-13 PROCEDURE — OTHER URINE PREGNANCY TEST: OTHER

## 2019-09-13 PROCEDURE — OTHER ORDER TESTS: OTHER

## 2019-09-13 PROCEDURE — 81025 URINE PREGNANCY TEST: CPT

## 2019-09-13 PROCEDURE — OTHER ISOTRETINOIN MONITORING: OTHER

## 2019-09-13 RX ORDER — ISOTRETINOIN 20 MG/1
CAPSULE, LIQUID FILLED ORAL 1
Qty: 30 | Refills: 0 | Status: ERX

## 2019-09-13 ASSESSMENT — LOCATION DETAILED DESCRIPTION DERM
LOCATION DETAILED: RIGHT INFERIOR LATERAL BUCCAL CHEEK
LOCATION DETAILED: RIGHT INFERIOR VERMILION LIP
LOCATION DETAILED: LEFT CENTRAL MANDIBULAR CHEEK

## 2019-09-13 ASSESSMENT — LOCATION ZONE DERM
LOCATION ZONE: FACE
LOCATION ZONE: LIP

## 2019-09-13 ASSESSMENT — LOCATION SIMPLE DESCRIPTION DERM
LOCATION SIMPLE: RIGHT LIP
LOCATION SIMPLE: LEFT CHEEK
LOCATION SIMPLE: RIGHT CHEEK

## 2019-09-13 NOTE — PROCEDURE: PATIENT SPECIFIC COUNSELING
Since BW has been so inconsistent, PA would prefer to keep pt at 20mg. Pt brought up if she can switch to the [place] office as this is closer to [place]. Gave pt info to call them to see if they office Accutane (h/o Dr. Barlow in the past not offering Acc). Advised pt to still schedule a F/U with us Incase she is unsuccessful transferring to [place] location. Since BW has been so inconsistent, PA would prefer to keep pt at 20mg. Pt brought up if she can switch to the [place] office as this is closer to [place]. Gave pt info to call them to see if they office Accutane (h/o Dr. Barlow in the past not offering Acc). Advised pt to still schedule a F/U with us Incase she is unsuccessful transferring to [place] location.

## 2019-09-13 NOTE — PROCEDURE: ISOTRETINOIN MONITORING
Female Completion Statement: After discussing her treatment course we decided to discontinue isotretinoin therapy at this time. I explained that she would need to continue her birth control methods for at least one month after the last dosage. She should also get a pregnancy test one month after the last dose. She shouldn't donate blood for one month after the last dose. She should call with any new symptoms of depression.
Male Completion Statement: After discussing his treatment course we decided to discontinue isotretinoin therapy at this time. He shouldn't donate blood for one month after the last dose. He should call with any new symptoms of depression.
Is Retinoid Dermatitis Present?: No
Dosing Month 4 (Required For Cumulative Dosing): 10mg Daily
Are Labs Available For Review?: No- Pending
Is Cheilitis Present?: Yes - Normal Treatment
Upper Range (In Mg/Kg): 150
Retinoid Dermatitis Aggressive Treatment: I recommended more frequent application of Cetaphil or CeraVe to the areas of dermatitis. I also prescribed a topical steroid for twice daily use until the dermatitis resolves.
Add Associated Diagnosis When Managing Medication Side Effects: Yes
Dosing Month 7 (Required For Cumulative Dosing): 20mg Daily
Pounds Preamble Statement (Weight Entered In Details Tab): Reported Weight in pounds:
Weight Units: pounds
Myalgia Treatment: I explained this is common when taking isotretinoin. If this worsens they will contact us. They may try OTC ibuprofen.
Female Pregnancy Counseling Text: Female patients should also be on two forms of birth control while taking this medication and for one month after their last dose.
Hypertriglyceridemia Monitoring: I explained this is common when taking isotretinoin. We will monitor closely.
Any Hypercholesterolemia?: Yes - Treatment
Most Recent Triglycerides (Optional): not available
Xerosis Normal Treatment: I recommended application of Cetaphil or CeraVe numerous times a day going to bed to all dry areas.
Nosebleeds Normal Treatment: I explained this is common when taking isotretinoin. I recommended saline mist in each nostril multiple times a day. If this worsens they will contact us.
Cheilitis Aggressive Treatment: I recommended application of Vaseline or Aquaphor numerous times a day (as often as every hour) and before going to bed. I also prescribed a topical steroid for twice daily use.
Retinoid Dermatitis Normal Treatment: I recommended more frequent application of Cetaphil or CeraVe to the areas of dermatitis.
Xerosis Aggressive Treatment: I recommended application of Cetaphil or CeraVe numerous times a day and before going to bed to all dry areas. I also prescribed a topical steroid for twice daily use.
Xerosis Aggressive Treatment: I recommended application of Cetaphil or CeraVe numerous times a day going to bed to all dry areas. I also prescribed a topical steroid for twice daily use.
Kilograms Preamble Statement (Weight Entered In Details Tab): Reported Weight in kilograms:
Any Hypertriglyceridemia?: Yes - Monitoring
Myalgia Monitoring: I explained this is common when taking isotretinoin. If this worsens they will contact us.
Xerosis Normal Treatment: I recommended application of Cetaphil or CeraVe numerous times a day and before going to bed to all dry areas.
Target Cumulative Dosage (In Mg/Kg): 135
Cheilitis Normal Treatment: I recommended application of Vaseline or Aquaphor numerous times a day (as often as every hour) and before going to bed. See Cheilitis PSC below.
Hypercholesterolemia Treatment: PCP prescribing Simvastatin 40mg
Lower Range (In Mg/Kg): 120
Patient Weight (Optional But Required For Cumulative Dose-Numbers And Decimals Only): 125
Detail Level: Zone
Hypertriglyceridemia Monitoring: I explained this is common when taking isotretinoin.
What Is The Patient's Gender: Female
Months Of Therapy Completed: 8
Comments: Cumulative dose: 3000 mg + 600 mg= 3600 mg  (Range: 6,000-9,000mg/kg)
Cheilitis Normal Treatment: I recommended application of Vaseline or Aquaphor numerous times a day (as often as every hour) and before going to bed.
Counseling Text: I reviewed the side effect in detail. Patient should get monthly blood tests, not donate blood, not drive at night if vision affected, and not share medication.
Headache Monitoring: I recommended monitoring the headaches for now. There is no evidence of increased intracranial pressure. They were instructed to call if the headaches are worsening.

## 2019-09-13 NOTE — PROCEDURE: TREATMENT REGIMEN
Continue Regimen: OTC Qd\\nIsotretinoin 20 mg qd
Continue Regimen: AHU frequently\\nMycolog-II bid prn
Detail Level: Simple
Detail Level: Zone

## 2019-09-19 RX ORDER — NORGESTIMATE AND ETHINYL ESTRADIOL 7DAYSX3 28
KIT ORAL QD
Qty: 84 | Refills: 1 | Status: ERX | COMMUNITY
Start: 2019-09-19

## 2019-10-11 ENCOUNTER — APPOINTMENT (OUTPATIENT)
Dept: URBAN - METROPOLITAN AREA CLINIC 237 | Age: 19
Setting detail: DERMATOLOGY
End: 2019-10-11

## 2019-10-11 DIAGNOSIS — L70.0 ACNE VULGARIS: ICD-10-CM

## 2019-10-11 DIAGNOSIS — E78.1 PURE HYPERGLYCERIDEMIA: ICD-10-CM

## 2019-10-11 DIAGNOSIS — Z79.899 OTHER LONG TERM (CURRENT) DRUG THERAPY: ICD-10-CM

## 2019-10-11 DIAGNOSIS — E78.00 PURE HYPERCHOLESTEROLEMIA, UNSPECIFIED: ICD-10-CM

## 2019-10-11 PROCEDURE — OTHER ORDER TESTS: OTHER

## 2019-10-11 PROCEDURE — OTHER ISOTRETINOIN MONITORING: OTHER

## 2019-10-11 PROCEDURE — OTHER URINE PREGNANCY TEST: OTHER

## 2019-10-11 PROCEDURE — 99213 OFFICE O/P EST LOW 20 MIN: CPT

## 2019-10-11 PROCEDURE — OTHER TREATMENT REGIMEN: OTHER

## 2019-10-11 PROCEDURE — OTHER PATIENT SPECIFIC COUNSELING: OTHER

## 2019-10-11 PROCEDURE — OTHER MIPS QUALITY: OTHER

## 2019-10-11 PROCEDURE — OTHER PRESCRIPTION: OTHER

## 2019-10-11 PROCEDURE — 81025 URINE PREGNANCY TEST: CPT

## 2019-10-11 RX ORDER — ISOTRETINOIN 20 MG/1
CAPSULE, LIQUID FILLED ORAL 1
Qty: 30 | Refills: 0 | Status: ERX

## 2019-10-11 ASSESSMENT — LOCATION SIMPLE DESCRIPTION DERM
LOCATION SIMPLE: RIGHT CHEEK
LOCATION SIMPLE: LEFT CHEEK

## 2019-10-11 ASSESSMENT — LOCATION ZONE DERM: LOCATION ZONE: FACE

## 2019-10-11 NOTE — PROCEDURE: PATIENT SPECIFIC COUNSELING
Detail Level: Zone
See PSC above.
Even without today’s BW, MD reviewed old labs; will keep Pt on Isotretinoin 20mg. Pt has an appt next month with PCP. MD would like pt discuss increasing Simvastatin dose so we could try to increase Isotretinoin.
See above.

## 2019-10-11 NOTE — PROCEDURE: ISOTRETINOIN MONITORING
Hypercholesterolemia Monitoring: I explained this is common when taking isotretinoin. We will monitor closely.
Female Pregnancy Counseling Text: Female patients should also be on two forms of birth control while taking this medication and for one month after their last dose.
Display Individual Monthly Dosage In The Note (If Yes Will Display All Dosages Which Are Not N/A): yes
Are Labs Available For Review?: No- Pending
Dosing Month 4 (Required For Cumulative Dosing): 10mg Daily
Myalgia Treatment: I explained this is common when taking isotretinoin. If this worsens they will contact us. They may try OTC ibuprofen.
Completed Therapy?: No
Female Completion Statement: After discussing her treatment course we decided to discontinue isotretinoin therapy at this time. I explained that she would need to continue her birth control methods for at least one month after the last dosage. She should also get a pregnancy test one month after the last dose. She shouldn't donate blood for one month after the last dose. She should call with any new symptoms of depression.
Months Of Therapy Completed: 9
Dosing Month 8 (Required For Cumulative Dosing): 20mg Daily
Hypertriglyceridemia Monitoring: I explained this is common when taking isotretinoin.
Retinoid Dermatitis Normal Treatment: I recommended more frequent application of Cetaphil or CeraVe to the areas of dermatitis.
Most Recent Cbc (Optional): not available
Myalgia Monitoring: I explained this is common when taking isotretinoin. If this worsens they will contact us.
Weight Units: pounds
Patient Weight (Optional But Required For Cumulative Dose-Numbers And Decimals Only): 125
Hypercholesterolemia Treatment: PCP prescribing Simvastatin 40mg
Kilograms Preamble Statement (Weight Entered In Details Tab): Reported Weight in kilograms:
Counseling Text: I reviewed the side effect in detail. Patient should get monthly blood tests, not donate blood, not drive at night if vision affected, and not share medication.
Cheilitis Aggressive Treatment: I recommended application of Vaseline or Aquaphor numerous times a day (as often as every hour) and before going to bed. I also prescribed a topical steroid for twice daily use.
Detail Level: Zone
Any Hypercholesterolemia?: Yes - Treatment
Pounds Preamble Statement (Weight Entered In Details Tab): Reported Weight in pounds:
Cheilitis Normal Treatment: I recommended application of Vaseline or Aquaphor numerous times a day (as often as every hour) and before going to bed.
Headache Monitoring: I recommended monitoring the headaches for now. There is no evidence of increased intracranial pressure. They were instructed to call if the headaches are worsening.
Nosebleeds Normal Treatment: I explained this is common when taking isotretinoin. I recommended saline mist in each nostril multiple times a day. If this worsens they will contact us.
Xerosis Normal Treatment: I recommended application of Cetaphil or CeraVe numerous times a day going to bed to all dry areas.
Comments: Cumulative dose: 3600 mg + 600 mg= 4200 mg  (Range: 6,000-9,000mg/kg)
Xerosis Normal Treatment: I recommended application of Cetaphil or CeraVe numerous times a day and before going to bed to all dry areas.
Lower Range (In Mg/Kg): 120
Xerosis Aggressive Treatment: I recommended application of Cetaphil or CeraVe numerous times a day and before going to bed to all dry areas. I also prescribed a topical steroid for twice daily use.
Cheilitis Normal Treatment: I recommended application of Vaseline or Aquaphor numerous times a day (as often as every hour) and before going to bed. See Cheilitis PSC below.
What Is The Patient's Gender: Female
Xerosis Aggressive Treatment: I recommended application of Cetaphil or CeraVe numerous times a day going to bed to all dry areas. I also prescribed a topical steroid for twice daily use.
Target Cumulative Dosage (In Mg/Kg): 135
Male Completion Statement: After discussing his treatment course we decided to discontinue isotretinoin therapy at this time. He shouldn't donate blood for one month after the last dose. He should call with any new symptoms of depression.
Upper Range (In Mg/Kg): 150
Retinoid Dermatitis Aggressive Treatment: I recommended more frequent application of Cetaphil or CeraVe to the areas of dermatitis. I also prescribed a topical steroid for twice daily use until the dermatitis resolves.
Any Hypertriglyceridemia?: Yes - Monitoring

## 2019-11-08 ENCOUNTER — APPOINTMENT (OUTPATIENT)
Dept: URBAN - METROPOLITAN AREA CLINIC 237 | Age: 19
Setting detail: DERMATOLOGY
End: 2019-11-08

## 2019-11-08 DIAGNOSIS — Z79.899 OTHER LONG TERM (CURRENT) DRUG THERAPY: ICD-10-CM

## 2019-11-08 DIAGNOSIS — L70.0 ACNE VULGARIS: ICD-10-CM

## 2019-11-08 DIAGNOSIS — E78.1 PURE HYPERGLYCERIDEMIA: ICD-10-CM

## 2019-11-08 DIAGNOSIS — E78.00 PURE HYPERCHOLESTEROLEMIA, UNSPECIFIED: ICD-10-CM

## 2019-11-08 PROCEDURE — 81025 URINE PREGNANCY TEST: CPT

## 2019-11-08 PROCEDURE — OTHER MIPS QUALITY: OTHER

## 2019-11-08 PROCEDURE — OTHER URINE PREGNANCY TEST: OTHER

## 2019-11-08 PROCEDURE — OTHER ORDER TESTS: OTHER

## 2019-11-08 PROCEDURE — OTHER PATIENT SPECIFIC COUNSELING: OTHER

## 2019-11-08 PROCEDURE — 99213 OFFICE O/P EST LOW 20 MIN: CPT

## 2019-11-08 PROCEDURE — OTHER PRESCRIPTION: OTHER

## 2019-11-08 PROCEDURE — OTHER ISOTRETINOIN MONITORING: OTHER

## 2019-11-08 PROCEDURE — OTHER TREATMENT REGIMEN: OTHER

## 2019-11-08 RX ORDER — ISOTRETINOIN 40 MG/1
1 CAPSULE, LIQUID FILLED ORAL QD
Qty: 30 | Refills: 0 | Status: ERX | COMMUNITY
Start: 2019-11-08

## 2019-11-08 ASSESSMENT — LOCATION SIMPLE DESCRIPTION DERM
LOCATION SIMPLE: RIGHT CHEEK
LOCATION SIMPLE: LEFT CHEEK

## 2019-11-08 ASSESSMENT — LOCATION ZONE DERM: LOCATION ZONE: FACE

## 2019-11-08 NOTE — PROCEDURE: PATIENT SPECIFIC COUNSELING
Discussed increasing dose to 40mg, but advised BW will likely worsen. Pt due to see PCP next month so will give pt a copy of her BW results from today. Advised this could be pt’s last month of Acc since acne has been well controlled.
See above.
Detail Level: Zone
See PSC above.

## 2019-11-08 NOTE — PROCEDURE: TREATMENT REGIMEN
Continue Regimen: OTC Qd
Detail Level: Simple
Continue Regimen: Simvastatin 40mg 1 tab qd
Modify Regimen: Increase Isotretinoin to 40mg qd

## 2019-11-08 NOTE — PROCEDURE: ISOTRETINOIN MONITORING
Any Nosebleeds?: No
Myalgia Monitoring: I explained this is common when taking isotretinoin. If this worsens they will contact us.
Dosing Month 1 (Required For Cumulative Dosing): 10mg Daily
Add Associated Diagnosis When Managing Medication Side Effects: Yes
Most Recent Triglycerides (Optional): 142H
Cheilitis Normal Treatment: I recommended application of Vaseline or Aquaphor numerous times a day (as often as every hour) and before going to bed. See Cheilitis PSC below.
Retinoid Dermatitis Normal Treatment: I recommended more frequent application of Cetaphil or CeraVe to the areas of dermatitis.
Male Completion Statement: After discussing his treatment course we decided to discontinue isotretinoin therapy at this time. He shouldn't donate blood for one month after the last dose. He should call with any new symptoms of depression.
Counseling Text: I reviewed the side effect in detail. Patient should get monthly blood tests, not donate blood, not drive at night if vision affected, and not share medication.
Weight Units: pounds
Cheilitis Normal Treatment: I recommended application of Vaseline or Aquaphor numerous times a day (as often as every hour) and before going to bed.
Xerosis Normal Treatment: I recommended application of Cetaphil or CeraVe numerous times a day and before going to bed to all dry areas.
Any Hypertriglyceridemia?: Yes - Monitoring
Retinoid Dermatitis Aggressive Treatment: I recommended more frequent application of Cetaphil or CeraVe to the areas of dermatitis. I also prescribed a topical steroid for twice daily use until the dermatitis resolves.
Headache Monitoring: I recommended monitoring the headaches for now. There is no evidence of increased intracranial pressure. They were instructed to call if the headaches are worsening.
Myalgia Treatment: I explained this is common when taking isotretinoin. If this worsens they will contact us. They may try OTC ibuprofen.
Cheilitis Aggressive Treatment: I recommended application of Vaseline or Aquaphor numerous times a day (as often as every hour) and before going to bed. I also prescribed a topical steroid for twice daily use.
Most Recent Cholesterol (Optional): 228H; Non-HDL: 162H; Ratio: 3.7N
Dosing Month 6 (Required For Cumulative Dosing): 20mg Daily
Months Of Therapy Completed: 10
Patient Weight (Optional But Required For Cumulative Dose-Numbers And Decimals Only): 120
Nosebleeds Normal Treatment: I explained this is common when taking isotretinoin. I recommended saline mist in each nostril multiple times a day. If this worsens they will contact us.
Hypercholesterolemia Monitoring: I explained this is common when taking isotretinoin. We will monitor closely.
Dosing Month 10 (Required For Cumulative Dosing): 40mg Daily
Hypercholesterolemia Treatment: PCP prescribing Simvastatin 40mg
Any Hypercholesterolemia?: Yes - Treatment
Target Cumulative Dosage (In Mg/Kg): 135
Kilograms Preamble Statement (Weight Entered In Details Tab): Reported Weight in kilograms:
Female Pregnancy Counseling Text: Female patients should also be on two forms of birth control while taking this medication and for one month after their last dose.
Most Recent Beta Hcg (Optional): <1
Xerosis Aggressive Treatment: I recommended application of Cetaphil or CeraVe numerous times a day going to bed to all dry areas. I also prescribed a topical steroid for twice daily use.
Comments: Cumulative dose: 4200 mg + 1200mg= 5400 mg  (Range: 5,400-8,100mg/kg)
Upper Range (In Mg/Kg): 150
What Is The Patient's Gender: Female
Female Completion Statement: After discussing her treatment course we decided to discontinue isotretinoin therapy at this time. I explained that she would need to continue her birth control methods for at least one month after the last dosage. She should also get a pregnancy test one month after the last dose. She shouldn't donate blood for one month after the last dose. She should call with any new symptoms of depression.
Xerosis Normal Treatment: I recommended application of Cetaphil or CeraVe numerous times a day going to bed to all dry areas.
Hypertriglyceridemia Monitoring: I explained this is common when taking isotretinoin.
Xerosis Aggressive Treatment: I recommended application of Cetaphil or CeraVe numerous times a day and before going to bed to all dry areas. I also prescribed a topical steroid for twice daily use.
Pounds Preamble Statement (Weight Entered In Details Tab): Reported Weight in pounds:
Most Recent Lfts (Optional): AST: normal, ALT: normal
Most Recent Cbc (Optional): All normal
Detail Level: Zone

## 2019-12-20 ENCOUNTER — APPOINTMENT (OUTPATIENT)
Dept: URBAN - METROPOLITAN AREA CLINIC 237 | Age: 19
Setting detail: DERMATOLOGY
End: 2019-12-20

## 2019-12-20 ENCOUNTER — RX ONLY (RX ONLY)
Age: 19
End: 2019-12-20

## 2019-12-20 DIAGNOSIS — L70.0 ACNE VULGARIS: ICD-10-CM

## 2019-12-20 DIAGNOSIS — Z79.899 OTHER LONG TERM (CURRENT) DRUG THERAPY: ICD-10-CM

## 2019-12-20 DIAGNOSIS — E78.1 PURE HYPERGLYCERIDEMIA: ICD-10-CM

## 2019-12-20 DIAGNOSIS — E78.00 PURE HYPERCHOLESTEROLEMIA, UNSPECIFIED: ICD-10-CM

## 2019-12-20 PROCEDURE — OTHER PATIENT SPECIFIC COUNSELING: OTHER

## 2019-12-20 PROCEDURE — OTHER MIPS QUALITY: OTHER

## 2019-12-20 PROCEDURE — 81025 URINE PREGNANCY TEST: CPT

## 2019-12-20 PROCEDURE — OTHER TREATMENT REGIMEN: OTHER

## 2019-12-20 PROCEDURE — OTHER URINE PREGNANCY TEST: OTHER

## 2019-12-20 PROCEDURE — 99213 OFFICE O/P EST LOW 20 MIN: CPT

## 2019-12-20 PROCEDURE — OTHER ISOTRETINOIN MONITORING: OTHER

## 2019-12-20 ASSESSMENT — LOCATION SIMPLE DESCRIPTION DERM
LOCATION SIMPLE: LEFT CHEEK
LOCATION SIMPLE: RIGHT CHEEK

## 2019-12-20 ASSESSMENT — LOCATION ZONE DERM: LOCATION ZONE: FACE

## 2019-12-20 NOTE — PROCEDURE: TREATMENT REGIMEN
Detail Level: Simple
Continue Regimen: Simvastatin 40mg 1 tab qd
Continue Regimen: OTC Qd\\nIsotretinoin 40mg qd

## 2019-12-20 NOTE — PROCEDURE: PATIENT SPECIFIC COUNSELING
Detail Level: Zone
Detail Level: Simple
See above.
Plan for 2 more months after today at the 40mg to ensure pt reaches higher end of her dosing range.

## 2019-12-20 NOTE — PROCEDURE: ISOTRETINOIN MONITORING
Most Recent Cbc (Optional): All normal
Is Cheilitis Present?: No
Female Pregnancy Counseling Text: Female patients should also be on two forms of birth control while taking this medication and for one month after their last dose.
Dosing Month 3 (Required For Cumulative Dosing): 20mg Daily
Hypercholesterolemia Treatment: Simvastatin 40mg
Pounds Preamble Statement (Weight Entered In Details Tab): Reported Weight in pounds:
Counseling Text: I reviewed the side effect in detail. Patient should get monthly blood tests, not donate blood, not drive at night if vision affected, and not share medication.
Dosing Month 10 (Required For Cumulative Dosing): 40mg Daily
Xerosis Aggressive Treatment: I recommended application of Cetaphil or CeraVe numerous times a day and before going to bed to all dry areas. I also prescribed a topical steroid for twice daily use.
Retinoid Dermatitis Aggressive Treatment: I recommended more frequent application of Cetaphil or CeraVe to the areas of dermatitis. I also prescribed a topical steroid for twice daily use until the dermatitis resolves.
Dosing Month 1 (Required For Cumulative Dosing): 10mg Daily
Months Of Therapy Completed: 11
Nosebleeds Normal Treatment: I explained this is common when taking isotretinoin. I recommended saline mist in each nostril multiple times a day. If this worsens they will contact us.
Myalgia Treatment: I explained this is common when taking isotretinoin. If this worsens they will contact us. They may try OTC ibuprofen.
Female Completion Statement: After discussing her treatment course we decided to discontinue isotretinoin therapy at this time. I explained that she would need to continue her birth control methods for at least one month after the last dosage. She should also get a pregnancy test one month after the last dose. She shouldn't donate blood for one month after the last dose. She should call with any new symptoms of depression.
Cheilitis Normal Treatment: I recommended application of Vaseline or Aquaphor numerous times a day (as often as every hour) and before going to bed. See Cheilitis PSC below.
Upper Range (In Mg/Kg): 150
Weight Units: pounds
Headache Monitoring: I recommended monitoring the headaches for now. There is no evidence of increased intracranial pressure. They were instructed to call if the headaches are worsening.
Xerosis Aggressive Treatment: I recommended application of Cetaphil or CeraVe numerous times a day going to bed to all dry areas. I also prescribed a topical steroid for twice daily use.
What Is The Patient's Gender: Female
Myalgia Monitoring: I explained this is common when taking isotretinoin. If this worsens they will contact us.
Cheilitis Aggressive Treatment: I recommended application of Vaseline or Aquaphor numerous times a day (as often as every hour) and before going to bed. I also prescribed a topical steroid for twice daily use.
Hypertriglyceridemia Monitoring: I explained this is common when taking isotretinoin.
Most Recent Triglycerides (Optional): 123
Retinoid Dermatitis Normal Treatment: I recommended more frequent application of Cetaphil or CeraVe to the areas of dermatitis.
Display Individual Monthly Dosage In The Note (If Yes Will Display All Dosages Which Are Not N/A): yes
Kilograms Preamble Statement (Weight Entered In Details Tab): Reported Weight in kilograms:
Hypercholesterolemia Monitoring: I explained this is common when taking isotretinoin. We will monitor closely.
Any Hypercholesterolemia?: Yes - Treatment
Patient Weight (Optional But Required For Cumulative Dose-Numbers And Decimals Only): 120
Target Cumulative Dosage (In Mg/Kg): 135
Cheilitis Normal Treatment: I recommended application of Vaseline or Aquaphor numerous times a day (as often as every hour) and before going to bed.
Most Recent Cholesterol (Optional): 266H; Non-HDL: 207H; Ratio: 4.5N
Detail Level: Zone
Male Completion Statement: After discussing his treatment course we decided to discontinue isotretinoin therapy at this time. He shouldn't donate blood for one month after the last dose. He should call with any new symptoms of depression.
Xerosis Normal Treatment: I recommended application of Cetaphil or CeraVe numerous times a day going to bed to all dry areas.
Most Recent Beta Hcg (Optional): <1
Xerosis Normal Treatment: I recommended application of Cetaphil or CeraVe numerous times a day and before going to bed to all dry areas.
Any Hypertriglyceridemia?: Yes - Monitoring
Comments: Cumulative dose: 5400mg + 1200mg= 6,600mg     (Range: 5,400-8,100mg/kg)
Most Recent Lfts (Optional): AST: normal, ALT: normal

## 2020-01-17 ENCOUNTER — APPOINTMENT (OUTPATIENT)
Dept: URBAN - METROPOLITAN AREA CLINIC 237 | Age: 20
Setting detail: DERMATOLOGY
End: 2020-01-17

## 2020-01-17 ENCOUNTER — RX ONLY (RX ONLY)
Age: 20
End: 2020-01-17

## 2020-01-17 DIAGNOSIS — E78.00 PURE HYPERCHOLESTEROLEMIA, UNSPECIFIED: ICD-10-CM

## 2020-01-17 DIAGNOSIS — E78.1 PURE HYPERGLYCERIDEMIA: ICD-10-CM

## 2020-01-17 DIAGNOSIS — Z79.899 OTHER LONG TERM (CURRENT) DRUG THERAPY: ICD-10-CM

## 2020-01-17 DIAGNOSIS — L70.0 ACNE VULGARIS: ICD-10-CM

## 2020-01-17 PROCEDURE — OTHER TREATMENT REGIMEN: OTHER

## 2020-01-17 PROCEDURE — 81025 URINE PREGNANCY TEST: CPT

## 2020-01-17 PROCEDURE — OTHER MIPS QUALITY: OTHER

## 2020-01-17 PROCEDURE — OTHER ISOTRETINOIN MONITORING: OTHER

## 2020-01-17 PROCEDURE — OTHER PATIENT SPECIFIC COUNSELING: OTHER

## 2020-01-17 PROCEDURE — OTHER URINE PREGNANCY TEST: OTHER

## 2020-01-17 PROCEDURE — 99213 OFFICE O/P EST LOW 20 MIN: CPT

## 2020-01-17 ASSESSMENT — LOCATION SIMPLE DESCRIPTION DERM
LOCATION SIMPLE: LEFT CHEEK
LOCATION SIMPLE: RIGHT CHEEK

## 2020-01-17 ASSESSMENT — LOCATION ZONE DERM: LOCATION ZONE: FACE

## 2020-01-17 NOTE — PROCEDURE: PATIENT SPECIFIC COUNSELING
Consider reduced dose for last mo of tx since she is already close to the high end of the traditional doses range.
Detail Level: Zone
See above.
Detail Level: Simple

## 2020-01-17 NOTE — PROCEDURE: ISOTRETINOIN MONITORING
Upper Range (In Mg/Kg): 150
Comments: Cumulative dose: 6,600mg+ 1200mg= 7,800mg (Range: 5,400-8,100mg/kg)
Lower Range (In Mg/Kg): 120
Nosebleeds Normal Treatment: I explained this is common when taking isotretinoin. I recommended saline mist in each nostril multiple times a day. If this worsens they will contact us.
Dosing Month 8 (Required For Cumulative Dosing): 20mg Daily
Myalgia Treatment: I explained this is common when taking isotretinoin. If this worsens they will contact us. They may try OTC ibuprofen.
Retinoid Dermatitis Aggressive Treatment: I recommended more frequent application of Cetaphil or CeraVe to the areas of dermatitis. I also prescribed a topical steroid for twice daily use until the dermatitis resolves.
Hypercholesterolemia Treatment: Simvastatin 40mg
Most Recent Lfts (Optional): AST: normal, ALT: normal
Add Associated Diagnosis When Managing Medication Side Effects: Yes
Any Hypercholesterolemia?: Yes - Treatment
Hypercholesterolemia Monitoring: I explained this is common when taking isotretinoin. We will monitor closely.
Months Of Therapy Completed: 12
Xerosis Aggressive Treatment: I recommended application of Cetaphil or CeraVe numerous times a day going to bed to all dry areas. I also prescribed a topical steroid for twice daily use.
Cheilitis Normal Treatment: I recommended application of Vaseline or Aquaphor numerous times a day (as often as every hour) and before going to bed. See Cheilitis PSC below.
Male Completion Statement: After discussing his treatment course we decided to discontinue isotretinoin therapy at this time. He shouldn't donate blood for one month after the last dose. He should call with any new symptoms of depression.
Dosing Month 11 (Required For Cumulative Dosing): 40mg Daily
Cheilitis Aggressive Treatment: I recommended application of Vaseline or Aquaphor numerous times a day (as often as every hour) and before going to bed. I also prescribed a topical steroid for twice daily use.
Add High Risk Medication Management Associated Diagnosis?: No
Pounds Preamble Statement (Weight Entered In Details Tab): Reported Weight in pounds:
Retinoid Dermatitis Normal Treatment: I recommended more frequent application of Cetaphil or CeraVe to the areas of dermatitis.
Weight Units: pounds
Any Hypertriglyceridemia?: Yes - Monitoring
Female Completion Statement: After discussing her treatment course we decided to discontinue isotretinoin therapy at this time. I explained that she would need to continue her birth control methods for at least one month after the last dosage. She should also get a pregnancy test one month after the last dose. She shouldn't donate blood for one month after the last dose. She should call with any new symptoms of depression.
Dosing Month 1 (Required For Cumulative Dosing): 10mg Daily
Hypertriglyceridemia Monitoring: I explained this is common when taking isotretinoin.
Female Pregnancy Counseling Text: Female patients should also be on two forms of birth control while taking this medication and for one month after their last dose.
Kilograms Preamble Statement (Weight Entered In Details Tab): Reported Weight in kilograms:
Most Recent Triglycerides (Optional): 165H
Headache Monitoring: I recommended monitoring the headaches for now. There is no evidence of increased intracranial pressure. They were instructed to call if the headaches are worsening.
Xerosis Normal Treatment: I recommended application of Cetaphil or CeraVe numerous times a day going to bed to all dry areas.
Xerosis Aggressive Treatment: I recommended application of Cetaphil or CeraVe numerous times a day and before going to bed to all dry areas. I also prescribed a topical steroid for twice daily use.
Most Recent Beta Hcg (Optional): <1
What Is The Patient's Gender: Female
Xerosis Normal Treatment: I recommended application of Cetaphil or CeraVe numerous times a day and before going to bed to all dry areas.
Cheilitis Normal Treatment: I recommended application of Vaseline or Aquaphor numerous times a day (as often as every hour) and before going to bed.
Myalgia Monitoring: I explained this is common when taking isotretinoin. If this worsens they will contact us.
Target Cumulative Dosage (In Mg/Kg): 135
Detail Level: Zone
Most Recent Cbc (Optional): All normal
Most Recent Cholesterol (Optional): 246H; Non-HDL: 179N;
Counseling Text: I reviewed the side effect in detail. Patient should get monthly blood tests, not donate blood, not drive at night if vision affected, and not share medication.

## 2020-01-17 NOTE — PROCEDURE: TREATMENT REGIMEN
Continue Regimen: Simvastatin 40mg 1 tab qd
Detail Level: Simple
Continue Regimen: OTC Qd\\nIsotretinoin 40mg qd

## 2020-02-20 ENCOUNTER — APPOINTMENT (OUTPATIENT)
Dept: URBAN - METROPOLITAN AREA CLINIC 237 | Age: 20
Setting detail: DERMATOLOGY
End: 2020-02-20

## 2020-02-20 DIAGNOSIS — L70.0 ACNE VULGARIS: ICD-10-CM

## 2020-02-20 DIAGNOSIS — E78.00 PURE HYPERCHOLESTEROLEMIA, UNSPECIFIED: ICD-10-CM

## 2020-02-20 DIAGNOSIS — E78.1 PURE HYPERGLYCERIDEMIA: ICD-10-CM

## 2020-02-20 DIAGNOSIS — Z79.899 OTHER LONG TERM (CURRENT) DRUG THERAPY: ICD-10-CM

## 2020-02-20 PROCEDURE — 81025 URINE PREGNANCY TEST: CPT

## 2020-02-20 PROCEDURE — OTHER PATIENT SPECIFIC COUNSELING: OTHER

## 2020-02-20 PROCEDURE — 99213 OFFICE O/P EST LOW 20 MIN: CPT

## 2020-02-20 PROCEDURE — OTHER MIPS QUALITY: OTHER

## 2020-02-20 PROCEDURE — OTHER ISOTRETINOIN MONITORING: OTHER

## 2020-02-20 PROCEDURE — OTHER URINE PREGNANCY TEST: OTHER

## 2020-02-20 PROCEDURE — OTHER TREATMENT REGIMEN: OTHER

## 2020-02-20 ASSESSMENT — LOCATION SIMPLE DESCRIPTION DERM
LOCATION SIMPLE: LEFT CHEEK
LOCATION SIMPLE: RIGHT CHEEK

## 2020-02-20 ASSESSMENT — LOCATION ZONE DERM: LOCATION ZONE: FACE

## 2020-02-20 NOTE — PROCEDURE: PATIENT SPECIFIC COUNSELING
See above.
Detail Level: Zone
Detail Level: Simple
Patient has reached maximum end of goal range. Also continues to have uptrending cholesterol/TGs. Well controlled acne, will discontinue isotretinoin. Pt will need to come back in a month to do urine pregnancy test and will need BW for next month to confirm labs are downtrending.
Advised cholesterol is more elevated than last month. Also notes elevated RBC and platelets. Will have pt do blood work next month for the last time to confirm labs are trending down.

## 2020-02-20 NOTE — PROCEDURE: TREATMENT REGIMEN
Continue Regimen: Simvastatin 40mg 1 tab qd - per PCP
Continue Regimen: OTC Qd
Detail Level: Simple
Discontinue Regimen: Isotretinoin

## 2020-02-20 NOTE — PROCEDURE: ISOTRETINOIN MONITORING
Myalgia Monitoring: I explained this is common when taking isotretinoin. If this worsens they will contact us.
Counseling Text: I reviewed the side effect in detail. Patient should get monthly blood tests, not donate blood, not drive at night if vision affected, and not share medication.
Headache Monitoring: I recommended monitoring the headaches for now. There is no evidence of increased intracranial pressure. They were instructed to call if the headaches are worsening.
Retinoid Dermatitis Normal Treatment: I recommended more frequent application of Cetaphil or CeraVe to the areas of dermatitis.
Male Completion Statement: After discussing his treatment course we decided to discontinue isotretinoin therapy at this time. He shouldn't donate blood for one month after the last dose. He should call with any new symptoms of depression.
Most Recent Beta Hcg (Optional): <1
Hypercholesterolemia Treatment: Simvastatin 40mg
Dosing Month 1 (Required For Cumulative Dosing): 10mg Daily
Dosing Month 5 (Required For Cumulative Dosing): 20mg Daily
Female Completion Statement: After discussing her treatment course we decided to discontinue isotretinoin therapy at this time. I explained that she would need to continue her birth control methods for at least one month after the last dosage. She should also get a pregnancy test one month after the last dose. She shouldn't donate blood for one month after the last dose. She should call with any new symptoms of depression.
Hypercholesterolemia Monitoring: I explained this is common when taking isotretinoin. We will monitor closely.
Any Nosebleeds?: No
Any Hypertriglyceridemia?: Yes - Monitoring
Retinoid Dermatitis Aggressive Treatment: I recommended more frequent application of Cetaphil or CeraVe to the areas of dermatitis. I also prescribed a topical steroid for twice daily use until the dermatitis resolves.
Patient Weight (Optional But Required For Cumulative Dose-Numbers And Decimals Only): 120
Pounds Preamble Statement (Weight Entered In Details Tab): Reported Weight in pounds:
Upper Range (In Mg/Kg): 150
Most Recent Cholesterol (Optional): 348H; Non-HDL: 277H; LDL: 242H
Weight Units: pounds
Xerosis Normal Treatment: I recommended application of Cetaphil or CeraVe numerous times a day and before going to bed to all dry areas.
Cheilitis Aggressive Treatment: I recommended application of Vaseline or Aquaphor numerous times a day (as often as every hour) and before going to bed. I also prescribed a topical steroid for twice daily use.
Comments: Cumulative dose: 8,400mg/kg (Range: 5,400-8,100mg/kg)
Hypertriglyceridemia Treatment: I explained this is common when taking isotretinoin. If this worsens they will contact us. They may try OTC ibuprofen.
Xerosis Aggressive Treatment: I recommended application of Cetaphil or CeraVe numerous times a day going to bed to all dry areas. I also prescribed a topical steroid for twice daily use.
Nosebleeds Normal Treatment: I explained this is common when taking isotretinoin. I recommended saline mist in each nostril multiple times a day. If this worsens they will contact us.
Cheilitis Normal Treatment: I recommended application of Vaseline or Aquaphor numerous times a day (as often as every hour) and before going to bed.
Months Of Therapy Completed: 12
Detail Level: Zone
Female Pregnancy Counseling Text: Female patients should also be on two forms of birth control while taking this medication and for one month after their last dose.
Dosing Month 11 (Required For Cumulative Dosing): 40mg Daily
Display Individual Monthly Dosage In The Note (If Yes Will Display All Dosages Which Are Not N/A): yes
Most Recent Lfts (Optional): AST: 25N; ALT: 9N
Cheilitis Normal Treatment: I recommended application of Vaseline or Aquaphor numerous times a day (as often as every hour) and before going to bed. See Cheilitis PSC below.
Xerosis Normal Treatment: I recommended application of Cetaphil or CeraVe numerous times a day going to bed to all dry areas.
Dosing Month 13 (Required For Cumulative Dosing): Discontinue
Any Hypercholesterolemia?: Yes - Treatment
Most Recent Triglycerides (Optional): 173H
Xerosis Aggressive Treatment: I recommended application of Cetaphil or CeraVe numerous times a day and before going to bed to all dry areas. I also prescribed a topical steroid for twice daily use.
Target Cumulative Dosage (In Mg/Kg): 135
Most Recent Cbc (Optional): All normal except: RBC: 5.4H; MCH; 25.9L; Platelet count: 429H
Hypertriglyceridemia Monitoring: I explained this is common when taking isotretinoin.
What Is The Patient's Gender: Female
Kilograms Preamble Statement (Weight Entered In Details Tab): Reported Weight in kilograms:

## 2020-03-20 ENCOUNTER — APPOINTMENT (OUTPATIENT)
Dept: URBAN - METROPOLITAN AREA CLINIC 237 | Age: 20
Setting detail: DERMATOLOGY
End: 2020-03-20

## 2020-03-20 DIAGNOSIS — L70.0 ACNE VULGARIS: ICD-10-CM

## 2020-03-20 DIAGNOSIS — E78.1 PURE HYPERGLYCERIDEMIA: ICD-10-CM

## 2020-03-20 DIAGNOSIS — E78.00 PURE HYPERCHOLESTEROLEMIA, UNSPECIFIED: ICD-10-CM

## 2020-03-20 DIAGNOSIS — Z79.899 OTHER LONG TERM (CURRENT) DRUG THERAPY: ICD-10-CM

## 2020-03-20 PROCEDURE — OTHER TREATMENT REGIMEN: OTHER

## 2020-03-20 PROCEDURE — OTHER ISOTRETINOIN MONITORING: OTHER

## 2020-03-20 PROCEDURE — OTHER MIPS QUALITY: OTHER

## 2020-03-20 PROCEDURE — OTHER URINE PREGNANCY TEST: OTHER

## 2020-03-20 PROCEDURE — 99213 OFFICE O/P EST LOW 20 MIN: CPT

## 2020-03-20 PROCEDURE — 81025 URINE PREGNANCY TEST: CPT

## 2020-03-20 PROCEDURE — OTHER PATIENT SPECIFIC COUNSELING: OTHER

## 2020-03-20 NOTE — PROCEDURE: ISOTRETINOIN MONITORING
Upper Range (In Mg/Kg): 150
Any Headache: No
Myalgia Monitoring: I explained this is common when taking isotretinoin. If this worsens they will contact us.
Dosing Month 5 (Required For Cumulative Dosing): 20mg Daily
Kilograms Preamble Statement (Weight Entered In Details Tab): Reported Weight in kilograms:
Target Cumulative Dosage (In Mg/Kg): 135
Retinoid Dermatitis Normal Treatment: I recommended more frequent application of Cetaphil or CeraVe to the areas of dermatitis.
Female Completion Statement: After discussing her treatment course we decided to discontinue isotretinoin therapy at this time. I explained that she would need to continue her birth control methods for at least one month after the last dosage. She should also get a pregnancy test one month after the last dose. She shouldn't donate blood for one month after the last dose. She should call with any new symptoms of depression.
Display Individual Monthly Dosage In The Note (If Yes Will Display All Dosages Which Are Not N/A): yes
Comments: Cumulative dose: 8,400mg/kg (Range: 5,400-8,100mg/kg)
Cheilitis Aggressive Treatment: I recommended application of Vaseline or Aquaphor numerous times a day (as often as every hour) and before going to bed. I also prescribed a topical steroid for twice daily use.
Dosing Month 1 (Required For Cumulative Dosing): 10mg Daily
Nosebleeds Normal Treatment: I explained this is common when taking isotretinoin. I recommended saline mist in each nostril multiple times a day. If this worsens they will contact us.
Hypercholesterolemia Monitoring: I explained this is common when taking isotretinoin. We will monitor closely.
Female Pregnancy Counseling Text: Female patients should also be on two forms of birth control while taking this medication and for one month after their last dose.
Any Hypercholesterolemia?: Yes - Treatment
What Is The Patient's Gender: Female
Xerosis Aggressive Treatment: I recommended application of Cetaphil or CeraVe numerous times a day going to bed to all dry areas. I also prescribed a topical steroid for twice daily use.
Weight Units: pounds
Dosing Month 10 (Required For Cumulative Dosing): 40mg Daily
Most Recent Cbc (Optional): All normal except: RBC: 5.29H, Hematocrit 45.2H, MCH 24.8L, MCHC 29.0L
Xerosis Aggressive Treatment: I recommended application of Cetaphil or CeraVe numerous times a day and before going to bed to all dry areas. I also prescribed a topical steroid for twice daily use.
Headache Monitoring: I recommended monitoring the headaches for now. There is no evidence of increased intracranial pressure. They were instructed to call if the headaches are worsening.
Myalgia Treatment: I explained this is common when taking isotretinoin. If this worsens they will contact us. They may try OTC ibuprofen.
Lower Range (In Mg/Kg): 120
Counseling Text: I reviewed the side effect in detail. Patient should get monthly blood tests, not donate blood, not drive at night if vision affected, and not share medication.
Hypertriglyceridemia Monitoring: I explained this is common when taking isotretinoin.
Male Completion Statement: After discussing his treatment course we decided to discontinue isotretinoin therapy at this time. He shouldn't donate blood for one month after the last dose. He should call with any new symptoms of depression.
Cheilitis Normal Treatment: I recommended application of Vaseline or Aquaphor numerous times a day (as often as every hour) and before going to bed.
Xerosis Normal Treatment: I recommended application of Cetaphil or CeraVe numerous times a day and before going to bed to all dry areas.
Months Of Therapy Completed: 12
Pounds Preamble Statement (Weight Entered In Details Tab): Reported Weight in pounds:
Detail Level: Zone
Hypercholesterolemia Treatment: Simvastatin 40mg
Retinoid Dermatitis Aggressive Treatment: I recommended more frequent application of Cetaphil or CeraVe to the areas of dermatitis. I also prescribed a topical steroid for twice daily use until the dermatitis resolves.
Any Hypertriglyceridemia?: Yes - Monitoring
Most Recent Lfts (Optional): AST: 18N; ALT: 13N
Most Recent Triglycerides (Optional): 158H
Xerosis Normal Treatment: I recommended application of Cetaphil or CeraVe numerous times a day going to bed to all dry areas.
Cheilitis Normal Treatment: I recommended application of Vaseline or Aquaphor numerous times a day (as often as every hour) and before going to bed. See Cheilitis PSC below.
Most Recent Beta Hcg (Optional): <3
Dosing Month 13 (Required For Cumulative Dosing): Discontinue
Most Recent Cholesterol (Optional): 323H; LDL: 214H, Ratio 4.2H

## 2020-03-20 NOTE — PROCEDURE: PATIENT SPECIFIC COUNSELING
Advised to follow up with her PCP within the next 1-2 months to check a lipid panel and adjust simvastatin if needed.  \\nSince she also wants to switch her contraceptive, advised to discuss with her PCP, and to be sure they change her to something that doesn’t have acne as a potential side effect.
Detail Level: Zone
See above.
Detail Level: Simple
Advised to avoid laser treatments, surgery and waxing for 6 months.

## 2020-10-12 ENCOUNTER — APPOINTMENT (OUTPATIENT)
Dept: URBAN - METROPOLITAN AREA CLINIC 273 | Age: 20
Setting detail: DERMATOLOGY
End: 2020-10-12